# Patient Record
Sex: FEMALE | Race: WHITE | NOT HISPANIC OR LATINO | Employment: FULL TIME | URBAN - METROPOLITAN AREA
[De-identification: names, ages, dates, MRNs, and addresses within clinical notes are randomized per-mention and may not be internally consistent; named-entity substitution may affect disease eponyms.]

---

## 2020-03-04 ENCOUNTER — OFFICE VISIT (OUTPATIENT)
Dept: URGENT CARE | Facility: CLINIC | Age: 55
End: 2020-03-04
Payer: COMMERCIAL

## 2020-03-04 VITALS
TEMPERATURE: 97.7 F | RESPIRATION RATE: 20 BRPM | HEART RATE: 108 BPM | WEIGHT: 193 LBS | HEIGHT: 63 IN | BODY MASS INDEX: 34.2 KG/M2 | DIASTOLIC BLOOD PRESSURE: 72 MMHG | SYSTOLIC BLOOD PRESSURE: 154 MMHG | OXYGEN SATURATION: 98 %

## 2020-03-04 DIAGNOSIS — R05.9 COUGHING: Primary | ICD-10-CM

## 2020-03-04 PROCEDURE — 99203 OFFICE O/P NEW LOW 30 MIN: CPT | Performed by: FAMILY MEDICINE

## 2020-03-04 RX ORDER — LEVOTHYROXINE SODIUM 0.07 MG/1
75 TABLET ORAL DAILY
COMMUNITY

## 2020-03-04 RX ORDER — BENZONATATE 100 MG/1
100 CAPSULE ORAL 3 TIMES DAILY PRN
Qty: 20 CAPSULE | Refills: 0 | Status: SHIPPED | OUTPATIENT
Start: 2020-03-04

## 2020-03-05 NOTE — PROGRESS NOTES
[facility]        NAME: Alexandre Savage is a 54 y o  female  : 1965    MRN: [id number]  DATE: 2020  TIME: 8:43 AM    Assessment and Plan   Coughing [R05]  1  Coughing  benzonatate (TESSALON PERLES) 100 mg capsule     Coughing likely secondary to acute viral URI with post nasal dripping  Levothyroxine and relative dehydration may play a role in her tachycardia  Supportive therapy encouraged  Tessalon perles prescribed for symptomatic relief  Patient Instructions     Follow up with PCP in 3-5 days  Proceed to  ER if symptoms worsen  Chief Complaint     Chief Complaint   Patient presents with    Cold Like Symptoms     scxs began yesterday: cough, chest burning, headache, post nasal drip, slight sore troat  chills off/on today at work  HA remains         History of Present Illness       80-year-old female presents today with 2 days of coughing associated with throat burning, chills and headache  Specifically reports that when coughing it burns her throat, but denies sore throat  No chest pain, dyspnea, abdominal pain, nausea or dizziness  Works in a   Took [employer] last night which helped her sleep, but symptoms continued persisting upon waking up today  Is concerned about the rapid rate of increased severity  Review of Systems   Review of Systems   Constitutional: Negative for chills and fever  HENT: Positive for postnasal drip and sore throat (due to cough)  Respiratory: Positive for cough  Negative for shortness of breath and wheezing  Cardiovascular: Positive for chest pain (burning)  Gastrointestinal: Negative for abdominal pain and nausea  Skin: Negative for rash  Allergic/Immunologic: Positive for environmental allergies  Neurological: Positive for headaches  Negative for dizziness           Current Medications       Current Outpatient Medications:     levothyroxine 75 mcg tablet, Take 75 mcg by mouth daily Unsure of dose, Disp: , Rfl:    benzonatate (TESSALON PERLES) 100 mg capsule, Take 1 capsule (100 mg total) by mouth 3 (three) times a day as needed for cough, Disp: 20 capsule, Rfl: 0    Current Allergies     Allergies as of 03/04/2020    (No Known Allergies)            The following portions of the patient's history were reviewed and updated as appropriate: allergies, current medications, past family history, past medical history, past social history, past surgical history and problem list      Past Medical History:   Diagnosis Date    Disease of thyroid gland        History reviewed  No pertinent surgical history  Family History   Problem Relation Age of Onset    Cancer Father     Hypertension Father          Medications have been verified  Objective   /72   Pulse (!) 108   Temp 97 7 °F (36 5 °C)   Resp 20   Ht 5' 3" (1 6 m)   Wt 87 5 kg (193 lb)   SpO2 98%   Breastfeeding No   BMI 34 19 kg/m²        Physical Exam     Physical Exam   Constitutional: She is oriented to person, place, and time  She appears well-developed and well-nourished  No distress  HENT:   Head: Normocephalic and atraumatic  Nose: Nose normal    Mouth/Throat: Oropharynx is clear and moist  No oropharyngeal exudate  Eyes: Conjunctivae are normal  Right eye exhibits no discharge  Left eye exhibits no discharge  Neck: No thyromegaly present  Cardiovascular: Regular rhythm  Pulmonary/Chest: Effort normal and breath sounds normal  No stridor  No respiratory distress  She has no wheezes  She has no rales  Lymphadenopathy:     She has no cervical adenopathy  Neurological: She is alert and oriented to person, place, and time  Skin: Skin is warm  She is not diaphoretic  Psychiatric: She has a normal mood and affect  Her behavior is normal  Judgment and thought content normal    Nursing note and vitals reviewed

## 2020-03-06 ENCOUNTER — OFFICE VISIT (OUTPATIENT)
Dept: URGENT CARE | Facility: CLINIC | Age: 55
End: 2020-03-06
Payer: COMMERCIAL

## 2020-03-06 VITALS
TEMPERATURE: 98.6 F | BODY MASS INDEX: 34.2 KG/M2 | RESPIRATION RATE: 18 BRPM | DIASTOLIC BLOOD PRESSURE: 79 MMHG | HEART RATE: 109 BPM | HEIGHT: 63 IN | SYSTOLIC BLOOD PRESSURE: 133 MMHG | OXYGEN SATURATION: 98 % | WEIGHT: 193 LBS

## 2020-03-06 DIAGNOSIS — J01.10 ACUTE NON-RECURRENT FRONTAL SINUSITIS: Primary | ICD-10-CM

## 2020-03-06 PROCEDURE — 99213 OFFICE O/P EST LOW 20 MIN: CPT | Performed by: FAMILY MEDICINE

## 2020-03-06 RX ORDER — AMOXICILLIN AND CLAVULANATE POTASSIUM 875; 125 MG/1; MG/1
1 TABLET, FILM COATED ORAL EVERY 12 HOURS SCHEDULED
Qty: 10 TABLET | Refills: 0 | Status: SHIPPED | OUTPATIENT
Start: 2020-03-06 | End: 2020-03-06 | Stop reason: SDUPTHER

## 2020-03-06 NOTE — PROGRESS NOTES
[facility]        NAME: Reena Marin is a 54 y o  female  : 1965    MRN: [id number]  DATE: 2020  TIME: 7:23 PM    Assessment and Plan   Acute non-recurrent frontal sinusitis [J01 10]  1  Acute non-recurrent frontal sinusitis  DISCONTINUED: amoxicillin-clavulanate (AUGMENTIN) 875-125 mg per tablet     Frontal sinusitis per clinical evaluation  Augmentin given in the office  Supportive therapy encouraged  Dispensed medication:  Augmentin  Patient Instructions     Follow up with PCP in 3-5 days  Proceed to  ER if symptoms worsen  Chief Complaint     Chief Complaint   Patient presents with    Cough     Seen her wednesday, still coughing, pain with coughing, fatigue          History of Present Illness       77-year-old female presents today due to 4 days of sinus pain and pressure especially over the left eye associated with headaches, nasal congestion, rhinorrhea, postnasal drip and coughing  Reports that when she coughs she has a lower central chest pain  No obvious dyspnea or wheezing  Was evaluated 2 days ago and prescribed Tessalon Perles which have reduced the frequency of her coughing  However cough is more severe when it happens  Has experienced tactile fevers and is concerned for possible sinus infection  Review of Systems   Review of Systems   Constitutional: Negative for chills and fever  HENT: Positive for congestion, postnasal drip, rhinorrhea, sinus pressure and sinus pain  Respiratory: Positive for cough  Negative for shortness of breath and wheezing  Cardiovascular: Positive for chest pain (With cough)  Gastrointestinal: Negative for abdominal pain and nausea  Neurological: Positive for headaches  Negative for dizziness           Current Medications       Current Outpatient Medications:     benzonatate (TESSALON PERLES) 100 mg capsule, Take 1 capsule (100 mg total) by mouth 3 (three) times a day as needed for cough, Disp: 20 capsule, Rfl: 0   levothyroxine 75 mcg tablet, Take 75 mcg by mouth daily Unsure of dose, Disp: , Rfl:     Current Allergies     Allergies as of 03/06/2020    (No Known Allergies)            The following portions of the patient's history were reviewed and updated as appropriate: allergies, current medications, past family history, past medical history, past social history, past surgical history and problem list      Past Medical History:   Diagnosis Date    Disease of thyroid gland        History reviewed  No pertinent surgical history  Family History   Problem Relation Age of Onset    Cancer Father     Hypertension Father          Medications have been verified  Objective   /79   Pulse (!) 109   Temp 98 6 °F (37 °C)   Resp 18   Ht 5' 3" (1 6 m)   Wt 87 5 kg (193 lb)   SpO2 98%   BMI 34 19 kg/m²        Physical Exam     Physical Exam   Constitutional: She is oriented to person, place, and time  She appears well-developed and well-nourished  She appears distressed  HENT:   Head: Normocephalic and atraumatic  Mouth/Throat: Oropharynx is clear and moist  No oropharyngeal exudate  Inflamed nasal mucosa with tenderness over the left frontal and maxillary sinuses  Eyes: Conjunctivae are normal  Right eye exhibits no discharge  Left eye exhibits no discharge  Neck: No thyromegaly present  Cardiovascular: Normal rate and regular rhythm  Pulmonary/Chest: Effort normal and breath sounds normal  No stridor  No respiratory distress  She has no wheezes  She has no rales  Lymphadenopathy:     She has no cervical adenopathy  Neurological: She is alert and oriented to person, place, and time  Skin: Skin is warm  She is not diaphoretic  No erythema  Psychiatric: She has a normal mood and affect  Her behavior is normal  Judgment and thought content normal    Nursing note and vitals reviewed

## 2024-12-18 ENCOUNTER — APPOINTMENT (EMERGENCY)
Dept: RADIOLOGY | Facility: HOSPITAL | Age: 59
End: 2024-12-18
Payer: COMMERCIAL

## 2024-12-18 ENCOUNTER — HOSPITAL ENCOUNTER (EMERGENCY)
Facility: HOSPITAL | Age: 59
Discharge: HOME | End: 2024-12-18
Attending: EMERGENCY MEDICINE
Payer: COMMERCIAL

## 2024-12-18 VITALS
BODY MASS INDEX: 34.2 KG/M2 | TEMPERATURE: 98.9 F | OXYGEN SATURATION: 98 % | HEIGHT: 63 IN | HEART RATE: 107 BPM | SYSTOLIC BLOOD PRESSURE: 138 MMHG | WEIGHT: 193 LBS | RESPIRATION RATE: 20 BRPM | DIASTOLIC BLOOD PRESSURE: 99 MMHG

## 2024-12-18 DIAGNOSIS — M17.11 OSTEOARTHRITIS OF RIGHT KNEE: ICD-10-CM

## 2024-12-18 DIAGNOSIS — M25.561 RIGHT KNEE PAIN: ICD-10-CM

## 2024-12-18 DIAGNOSIS — M62.831 MUSCLE SPASM OF RIGHT CALF: Primary | ICD-10-CM

## 2024-12-18 PROCEDURE — 99284 EMERGENCY DEPT VISIT MOD MDM: CPT | Performed by: EMERGENCY MEDICINE

## 2024-12-18 PROCEDURE — 73560 X-RAY EXAM OF KNEE 1 OR 2: CPT

## 2024-12-18 PROCEDURE — 93971 EXTREMITY STUDY: CPT | Performed by: SURGERY

## 2024-12-18 PROCEDURE — 96372 THER/PROPH/DIAG INJ SC/IM: CPT

## 2024-12-18 PROCEDURE — 99283 EMERGENCY DEPT VISIT LOW MDM: CPT

## 2024-12-18 PROCEDURE — 93971 EXTREMITY STUDY: CPT

## 2024-12-18 RX ORDER — NAPROXEN 500 MG/1
500 TABLET ORAL 2 TIMES DAILY WITH MEALS
Qty: 30 TABLET | Refills: 0 | Status: SHIPPED | OUTPATIENT
Start: 2024-12-18

## 2024-12-18 RX ORDER — KETOROLAC TROMETHAMINE 30 MG/ML
15 INJECTION, SOLUTION INTRAMUSCULAR; INTRAVENOUS ONCE
Status: COMPLETED | OUTPATIENT
Start: 2024-12-18 | End: 2024-12-18

## 2024-12-18 RX ORDER — SENNOSIDES 8.6 MG
650 CAPSULE ORAL EVERY 8 HOURS PRN
Qty: 30 TABLET | Refills: 0 | Status: SHIPPED | OUTPATIENT
Start: 2024-12-18

## 2024-12-18 RX ORDER — ACETAMINOPHEN 325 MG/1
975 TABLET ORAL ONCE
Status: COMPLETED | OUTPATIENT
Start: 2024-12-18 | End: 2024-12-18

## 2024-12-18 RX ORDER — METHOCARBAMOL 500 MG/1
500 TABLET, FILM COATED ORAL 2 TIMES DAILY
Qty: 20 TABLET | Refills: 0 | Status: SHIPPED | OUTPATIENT
Start: 2024-12-18

## 2024-12-18 RX ADMIN — ACETAMINOPHEN 975 MG: 325 TABLET ORAL at 11:26

## 2024-12-18 RX ADMIN — KETOROLAC TROMETHAMINE 15 MG: 30 INJECTION, SOLUTION INTRAMUSCULAR; INTRAVENOUS at 11:27

## 2024-12-18 NOTE — ED PROVIDER NOTES
Time reflects when diagnosis was documented in both MDM as applicable and the Disposition within this note       Time User Action Codes Description Comment    12/18/2024 11:49 AM Zeferino Gonzalez Add [M62.831] Muscle spasm of right calf     12/18/2024 11:49 AM Zeferino Gonzalez Add [M25.561] Right knee pain     12/18/2024 11:50 AM Zeferino Gonzalez Add [M17.11] Osteoarthritis of right knee           ED Disposition       ED Disposition   Discharge    Condition   Stable    Date/Time   Wed Dec 18, 2024 11:45 AM    Comment   Keesha Griffith discharge to home/self care.                   Assessment & Plan       Medical Decision Making  Patient's leg pain may reflect referred pain from the back given her back pain several days ago, may also reflect a primary process of the knee related to known osteoarthritis.  Patient with no infectious symptoms.  No history of trauma.  No symptoms of cauda equina syndrome.  I ordered and independently interpreted plain films of the right knee which demonstrate severe osteoarthritis.  I ordered and reviewed a venous duplex study which did not demonstrate a DVT.  I prescribed patient multimodal pain regiment, provided with information of follow-up with orthopedics, and discharged patient with return precautions.    Amount and/or Complexity of Data Reviewed  Radiology: ordered.    Risk  OTC drugs.  Prescription drug management.             Medications   ketorolac (TORADOL) injection 15 mg (15 mg Intramuscular Given 12/18/24 1127)   acetaminophen (TYLENOL) tablet 975 mg (975 mg Oral Given 12/18/24 1126)       ED Risk Strat Scores                          SBIRT 20yo+      Flowsheet Row Most Recent Value   Initial Alcohol Screen: US AUDIT-C     1. How often do you have a drink containing alcohol? 0 Filed at: 12/18/2024 1008   2. How many drinks containing alcohol do you have on a typical day you are drinking?  0 Filed at: 12/18/2024 1008   3a. Male UNDER 65: How often do you have five  or more drinks on one occasion? 0 Filed at: 12/18/2024 1008   3b. FEMALE Any Age, or MALE 65+: How often do you have 4 or more drinks on one occassion? 0 Filed at: 12/18/2024 1008   Audit-C Score 0 Filed at: 12/18/2024 1008   JUSTINO: How many times in the past year have you...    Used an illegal drug or used a prescription medication for non-medical reasons? Never Filed at: 12/18/2024 1008                            History of Present Illness       Chief Complaint   Patient presents with    Leg Pain     Patient comes today with right leg pain, behind the knee, and in the calf area, pain is getting worse, hard to put pressure on leg, since Sat.       Past Medical History:   Diagnosis Date    Disease of thyroid gland       History reviewed. No pertinent surgical history.   Family History   Problem Relation Age of Onset    Cancer Father     Hypertension Father       Social History     Tobacco Use    Smoking status: Never    Smokeless tobacco: Never   Vaping Use    Vaping status: Never Used   Substance Use Topics    Alcohol use: Yes    Drug use: Never      E-Cigarette/Vaping    E-Cigarette Use Never User       E-Cigarette/Vaping Substances      I have reviewed and agree with the history as documented.     Patient is a 59-year-old female history of osteoarthritis of the right knee, sciatica presenting for evaluation of several days of right sided back pain, right knee pain, right calf spasm.  Patient states that symptoms for started after she was hanging [employer] lights.  Patient states that the back pain has resolved but she continues to have pain behind the right knee and feels like the calf is spasmed.  Patient denies any surrounding redness, warmth, fevers, chills, denies focal weakness or numbness, denies urinary or bowel incontinence, urinary retention, saddle anesthesia.  Patient is ambulatory despite the pain but states that she has pain straightening the right knee.  Patient denies any trauma to the area.   Patient denies chest pain, shortness of breath, pleurisy.  Patient denies history of DVT/PE.        Review of Systems   Constitutional:  Negative for chills and fever.   Respiratory:  Negative for cough and shortness of breath.    Musculoskeletal:  Positive for arthralgias (Right knee) and back pain (Resolved). Negative for myalgias.   Neurological:  Negative for weakness and numbness.   All other systems reviewed and are negative.          Objective       ED Triage Vitals   Temperature Pulse Blood Pressure Respirations SpO2 Patient Position - Orthostatic VS   12/18/24 [facility] 12/18/24 [facility] 12/18/24 [facility] 12/18/24 [facility] 12/18/24 [facility] 12/18/24 [facility]   98.9 °F (37.2 °C) (!) 107 138/99 20 98 % Sitting      Temp Source Heart Rate Source BP Location FiO2 (%) Pain Score    12/18/24 1007 12/18/24 [facility] 12/18/24 [facility] -- 12/18/24 1126    Tympanic Monitor Right arm  8      Vitals      Date and Time Temp Pulse SpO2 Resp BP Pain Score FACES Pain Rating User   12/18/24 1127 -- -- -- -- -- 8 -- [facility]D   12/18/24 1126 -- -- -- -- -- 8 -- [facility]   12/18/24 [facility] 98.9 °F (37.2 °C) 107 98 % 20 138/99 -- -- MF            Physical Exam  Vitals and nursing note reviewed.   Constitutional:       General: She is not in acute distress.     Appearance: Normal appearance. She is not ill-appearing, toxic-appearing or diaphoretic.      Comments: Well-appearing, nontoxic, nondistressed   HENT:      Head: Normocephalic and atraumatic.      Comments: Moist mucous membranes     Right Ear: External ear normal.      Left Ear: External ear normal.   Eyes:      General:         Right eye: No discharge.         Left eye: No discharge.   Cardiovascular:      Comments: Regular rate and rhythm, no murmurs rubs or gallops.  Extremities warm and well-perfused without mottling  Pulmonary:      Effort: No respiratory distress.      Comments: No increased work of breathing.  Speaking in complete sentences.  Lungs clear to auscultation bilaterally without wheezes, rales,  rhonchi.  Satting 98% on room air indicating adequate oxygenation  Abdominal:      General: There is no distension.   Musculoskeletal:         General: No deformity.      Cervical back: Normal range of motion.      Comments: Tenderness of the right calf and popliteal area.  No appreciable redness, warmth, or swelling.  Full sensation throughout the right lower extremity.  2+ DP and PT pulses   Skin:     Findings: No lesion or rash.   Neurological:      Mental Status: She is alert and oriented to person, place, and time. Mental status is at baseline.   Psychiatric:         Mood and Affect: Mood and affect normal.         Results Reviewed       None            VAS lower limb venous duplex study, unilateral/limited    (Results Pending)   XR knee 1 or 2 views right    (Results Pending)       Procedures    ED Medication and Procedure Management   Prior to Admission Medications   Prescriptions Last Dose Informant Patient Reported? Taking?   benzonatate (TESSALON PERLES) 100 mg capsule   No No   Sig: Take 1 capsule (100 mg total) by mouth 3 (three) times a day as needed for cough   levothyroxine 75 mcg tablet   Yes Yes   Sig: Take 75 mcg by mouth daily Unsure of dose      Facility-Administered Medications: None     Discharge Medication List as of 12/18/2024 11:55 AM        START taking these medications    Details   acetaminophen (TYLENOL) 650 mg CR tablet Take 1 tablet (650 mg total) by mouth every 8 (eight) hours as needed for mild pain, Starting Wed 12/18/2024, Normal      methocarbamol (ROBAXIN) 500 mg tablet Take 1 tablet (500 mg total) by mouth 2 (two) times a day, Starting Wed 12/18/2024, Normal      naproxen (Naprosyn) 500 mg tablet Take 1 tablet (500 mg total) by mouth 2 (two) times a day with meals, Starting Wed 12/18/2024, Normal           CONTINUE these medications which have NOT CHANGED    Details   levothyroxine 75 mcg tablet Take 75 mcg by mouth daily Unsure of dose, Historical Med      benzonatate (TESSALON  PERLES) 100 mg capsule Take 1 capsule (100 mg total) by mouth 3 (three) times a day as needed for cough, Starting Wed 3/4/2020, Normal           No discharge procedures on file.  ED SEPSIS DOCUMENTATION   Time reflects when diagnosis was documented in both MDM as applicable and the Disposition within this note       Time User Action Codes Description Comment    12/18/2024 11:49 AM Zeferino Gonzalez Add [M62.831] Muscle spasm of right calf     12/18/2024 11:49 AM Zeferino Gonzalez Add [M25.561] Right knee pain     12/18/2024 11:50 AM Zeferino Gonzalez Add [M17.11] Osteoarthritis of right knee                  Zeferino Gonzalez MD  12/18/24 1210

## 2024-12-18 NOTE — DISCHARGE INSTRUCTIONS
Ultrasound did not show blood clot.  X-ray showed significant degenerative disease of your knee.  Call the provided number to schedule follow-up with orthopedics.  Use prescribed Naprosyn, Tylenol, Robaxin to help with muscle pain and spasm.  If you have any severe worsening of pain, fevers, chills, chest pain, shortness of breath, new weakness or numbness of your affected leg, return to the emergency department.

## 2024-12-18 NOTE — Clinical Note
Keesha Griffith was seen and treated in our emergency department on 12/18/2024.                Diagnosis:     Keesha  .    She may return on this date: 12/20/2024         If you have any questions or concerns, please don't hesitate to call.      Zeferino Gonzalez MD    ______________________________           _______________          _______________  Hospital Representative                              Date                                Time

## 2025-04-04 ENCOUNTER — OFFICE VISIT (OUTPATIENT)
Dept: OBGYN CLINIC | Facility: CLINIC | Age: 60
End: 2025-04-04
Payer: COMMERCIAL

## 2025-04-04 ENCOUNTER — APPOINTMENT (OUTPATIENT)
Dept: RADIOLOGY | Facility: CLINIC | Age: 60
End: 2025-04-04
Payer: COMMERCIAL

## 2025-04-04 VITALS — BODY MASS INDEX: 33.84 KG/M2 | WEIGHT: 191 LBS | HEIGHT: 63 IN

## 2025-04-04 DIAGNOSIS — M25.561 RIGHT KNEE PAIN, UNSPECIFIED CHRONICITY: ICD-10-CM

## 2025-04-04 DIAGNOSIS — G89.29 CHRONIC PAIN OF RIGHT KNEE: ICD-10-CM

## 2025-04-04 DIAGNOSIS — M25.561 CHRONIC PAIN OF RIGHT KNEE: ICD-10-CM

## 2025-04-04 DIAGNOSIS — M17.11 PRIMARY OSTEOARTHRITIS OF RIGHT KNEE: Primary | ICD-10-CM

## 2025-04-04 DIAGNOSIS — Z01.818 PRE-OP EXAM: ICD-10-CM

## 2025-04-04 PROCEDURE — 73560 X-RAY EXAM OF KNEE 1 OR 2: CPT

## 2025-04-04 PROCEDURE — 73562 X-RAY EXAM OF KNEE 3: CPT

## 2025-04-04 PROCEDURE — 99205 OFFICE O/P NEW HI 60 MIN: CPT | Performed by: ORTHOPAEDIC SURGERY

## 2025-04-04 RX ORDER — FOLIC ACID 1 MG/1
1 TABLET ORAL DAILY
Qty: 30 TABLET | Refills: 0 | Status: SHIPPED | OUTPATIENT
Start: 2025-04-04 | End: 2025-04-11 | Stop reason: SDUPTHER

## 2025-04-04 RX ORDER — FERROUS SULFATE 324(65)MG
324 TABLET, DELAYED RELEASE (ENTERIC COATED) ORAL
Qty: 30 TABLET | Refills: 0 | Status: SHIPPED | OUTPATIENT
Start: 2025-04-04 | End: 2025-04-11 | Stop reason: SDUPTHER

## 2025-04-04 RX ORDER — ZINC SULFATE 50(220)MG
220 CAPSULE ORAL DAILY
Qty: 30 CAPSULE | Refills: 0 | Status: SHIPPED | OUTPATIENT
Start: 2025-04-04 | End: 2025-04-11 | Stop reason: SDUPTHER

## 2025-04-04 RX ORDER — MUPIROCIN 20 MG/G
OINTMENT TOPICAL 2 TIMES DAILY
Qty: 15 G | Refills: 0 | Status: SHIPPED | OUTPATIENT
Start: 2025-04-04 | End: 2025-04-11 | Stop reason: SDUPTHER

## 2025-04-04 RX ORDER — ASCORBIC ACID 500 MG
500 TABLET ORAL 2 TIMES DAILY
Qty: 60 TABLET | Refills: 0 | Status: SHIPPED | OUTPATIENT
Start: 2025-04-04 | End: 2025-04-11 | Stop reason: SDUPTHER

## 2025-04-04 NOTE — H&P (VIEW-ONLY)
Name: Keesha Griffith      : 1965      MRN: [id number]  Encounter Provider: Kamlesh Richter DO  Encounter Date: 2025   Encounter department: [facility]  :  Assessment & Plan  Primary osteoarthritis of right knee  Severe, end stage right knee osteoarthritis.   She has failed non operative management.   Right total knee arthroplasty, press fit, same day informed surgical consent form was signed.   Pre operative vitamins were discussed.   Post operative recovery was discussed including PT.  Follow up in the office 2 weeks post op with x-rays upon arrival.   Orders:    Case request operating room: ARTHROPLASTY KNEE TOTAL W ROBOT - RIGHT - PRESS FIT - SAME DAY; Standing    Comprehensive metabolic panel; Future    Hemoglobin A1C W/EAG Estimation; Future    CBC and differential; Future    MRSA culture; Future    If Symptomatic, order: UA w Reflex to Microscopic w Reflex to Culture    Protime-INR; Future    APTT; Future    Ambulatory referral to Cardiology; Future    Ambulatory referral to Physical Therapy; Future    Ambulatory referral to Family Practice; Future    EKG 12 lead; Future    ascorbic acid (VITAMIN C) 500 MG tablet; Take 1 tablet (500 mg total) by mouth 2 (two) times a day    ferrous sulfate 324 (65 Fe) mg; Take 1 tablet (324 mg total) by mouth daily before breakfast    folic acid (FOLVITE) 1 mg tablet; Take 1 tablet (1 mg total) by mouth daily    mupirocin (BACTROBAN) 2 % ointment; Apply topically 2 (two) times a day    zinc sulfate (ZINCATE) 220 mg capsule; Take 1 capsule (220 mg total) by mouth daily    Cholecalciferol (VITAMIN D3) 1,000 units tablet; Take 1 tablet (1,000 Units total) by mouth daily    Chronic pain of right knee    Orders:    XR knee 3 vw right non injury; Future    XR knee 1 or 2 vw left; Future    Case request operating room: ARTHROPLASTY KNEE TOTAL W ROBOT - RIGHT - PRESS FIT - SAME DAY; Standing    Comprehensive metabolic panel; Future     Hemoglobin A1C W/EAG Estimation; Future    CBC and differential; Future    MRSA culture; Future    If Symptomatic, order: UA w Reflex to Microscopic w Reflex to Culture    Protime-INR; Future    APTT; Future    Ambulatory referral to Cardiology; Future    Ambulatory referral to Physical Therapy; Future    Ambulatory referral to Family Practice; Future    EKG 12 lead; Future    ascorbic acid (VITAMIN C) 500 MG tablet; Take 1 tablet (500 mg total) by mouth 2 (two) times a day    ferrous sulfate 324 (65 Fe) mg; Take 1 tablet (324 mg total) by mouth daily before breakfast    folic acid (FOLVITE) 1 mg tablet; Take 1 tablet (1 mg total) by mouth daily    mupirocin (BACTROBAN) 2 % ointment; Apply topically 2 (two) times a day    zinc sulfate (ZINCATE) 220 mg capsule; Take 1 capsule (220 mg total) by mouth daily    Cholecalciferol (VITAMIN D3) 1,000 units tablet; Take 1 tablet (1,000 Units total) by mouth daily    Pre-op exam    Orders:    Ambulatory referral to Cardiology; Future    Ambulatory referral to Physical Therapy; Future    Ambulatory referral to Family Practice; Future         Keesha Griffith is a pleasant 60 y.o. female who presents to the office for initial evaluation of right knee pain. After a thorough history, physical exam and imaging review, Keesha is symptomatic due to severe, end stage right knee osteoarthritis. Based on the progression of her underlying disease and persistent pain despite nonoperative management including activity modification, maintenance of appropriate weight, OTC NSAID and analgesics, and intra-articular injection therapy, I explained that she would benefit from right total knee arthroplasty.  We also discussed her home exercise program which she has been participating in for greater than 3 months prior to her evaluation with me.  We discussed the efficacy of formal physical therapy and due to the the severe end-stage, bone-on-bone, deforming arthritis of her right knee physical therapy  would provide no significant benefit and would only exacerbate her discomfort and disease.  The pre ricardo and postoperative expectations were discussed here in the office today. The risks and benefits of undergoing right total knee arthroplasty were discussed at length and consents were signed and placed in the chart.  Please see risk discussion below. See risk stratification below. She understands she requires preoperative clearance from her  PCP and Cardiology. She is an excellent candidate for outpatient physical therapy postoperatively. she will be placed on our same-day  discharge pathway. We will utilize pressfit implants. she will meet with my surgery scheduler today to pick a date for her procedure and make preoperative arrangements.  All of her questions and concerns were addressed today.  Patients daughter will come up to help her post operatively. We will see her back at time of surgery.     History of MRSA: no  History of DVT/PE: no  History of Diabetes:no  History of Malignancy: no  History of GI bleed/Peptic ulcer: no  Are you a smoker:no  Are you on medication for autoimmune disease (rheumatoid arthritis, psoriatic arthritis, lupus, etc.): No  Are you using OTC supplements: No  Clearances: PCP and Cardiology  Implants: Press-fit knee  Discharge: Same-Day  PT: Outpatient  Postop Follow-up: 2 weeks    The patient was counseled in detail regarding the diagnosis, the treatment options available, the prognosis of each treatment option, the potential risks and complications.  These are, but are not limited to; deep vein thrombosis, pulmonary embolism, neurologic and vascular injury, infection, instability, leg length discrepancy, dislocation, hematoma, reflex sympathetic dystrophy, loss of range of motion, ankylosis of the knee, fracture, screw or prosthetic perforation, chronic pain, acute pain, chronic leg pain and edema, loosening, death, heart attack, and stroke.  The patient's questions were answered in  "detail.  The patient demonstrates understanding of these risks and wishes to proceed with surgery.      Return in about 2 weeks (around 4/18/2025) for post op.    I have personally reviewed pertinent imaging in PACS.  X-rays of the right knee obtained in the office today demonstrate severe, end stage right knee osteoarthritis. No acute fractures or dislocation. No lytic or blastic lesions.      History: Keesha Griffith is a 60 y.o. female presents to the office for initial evaluation of right knee pain. She notes pain more so to the medial aspect of her right knee. Symptoms have been ongoing for years, but are worsening. Pain will worsen with prolonged walking as well as with steps. Pain will also worsen at the end of the day. She feels her knee is stiff and she is lacking knee ROM. She notes tightness to the muscles in the posterior aspect of the knee. She has tried knee injections in the past, a few years ago, which were partially beneficial for her. She was previously treating at [facility]. She will take Tylenol as needed for pain control.     History of MRSA: no  History of DVT/PE: no  History of Diabetes:no  History of Malignancy: no  History of GI bleed/Peptic ulcer: no  Are you a smoker:no  Are you on medication for autoimmune disease (rheumatoid arthritis, psoriatic arthritis, lupus, etc.): no  Are you using OTC supplements: No  Clearances: PCP and Cardiology  Implants: Press-fit knee  Discharge: Same-Day  PT: Outpatient  Postop Follow-up: 2 weeks        Estimated body mass index is 33.83 kg/m² as calculated from the following:    Height as of this encounter: 5' 3\" (1.6 m).    Weight as of this encounter: 86.6 kg (191 lb).    No results found for: \"HGBA1C\"    Social History     Occupational History    Not on file   Tobacco Use    Smoking status: Never    Smokeless tobacco: Never   Vaping Use    Vaping status: Never Used   Substance and Sexual Activity    Alcohol use: Yes    Drug use: Never    Sexual activity: " Not on file       Objective:  Right Knee Exam     Tenderness   The patient is experiencing tenderness in the medial joint line.    Range of Motion   Extension:  15   Flexion:  100     Tests   Varus: negative Valgus: negative  Lachman:  Anterior - negative    Posterior - negative  Drawer:  Anterior - negative    Posterior - negative    Other   Erythema: absent  Scars: absent  Sensation: normal  Pulse: present    Comments:  Passively correctable varus deformity with good endpoint   Significant ricardo patella crepitus   + grind   Stable 0, 30 and 90 degrees               There were no vitals filed for this visit.    Subjective:  Past Medical History:   Diagnosis Date    Disease of thyroid gland        History reviewed. No pertinent surgical history.    Family History   Problem Relation Age of Onset    Cancer Father     Hypertension Father          Current Outpatient Medications:     acetaminophen (TYLENOL) 650 mg CR tablet, Take 1 tablet (650 mg total) by mouth every 8 (eight) hours as needed for mild pain, Disp: 30 tablet, Rfl: 0    ascorbic acid (VITAMIN C) 500 MG tablet, Take 1 tablet (500 mg total) by mouth 2 (two) times a day, Disp: 60 tablet, Rfl: 0    benzonatate (TESSALON PERLES) 100 mg capsule, Take 1 capsule (100 mg total) by mouth 3 (three) times a day as needed for cough, Disp: 20 capsule, Rfl: 0    Cholecalciferol (VITAMIN D3) 1,000 units tablet, Take 1 tablet (1,000 Units total) by mouth daily, Disp: 30 tablet, Rfl: 0    ferrous sulfate 324 (65 Fe) mg, Take 1 tablet (324 mg total) by mouth daily before breakfast, Disp: 30 tablet, Rfl: 0    folic acid (FOLVITE) 1 mg tablet, Take 1 tablet (1 mg total) by mouth daily, Disp: 30 tablet, Rfl: 0    levothyroxine 75 mcg tablet, Take 75 mcg by mouth daily Unsure of dose, Disp: , Rfl:     methocarbamol (ROBAXIN) 500 mg tablet, Take 1 tablet (500 mg total) by mouth 2 (two) times a day, Disp: 20 tablet, Rfl: 0    mupirocin (BACTROBAN) 2 % ointment, Apply topically 2  (two) times a day, Disp: 15 g, Rfl: 0    naproxen (Naprosyn) 500 mg tablet, Take 1 tablet (500 mg total) by mouth 2 (two) times a day with meals, Disp: 30 tablet, Rfl: 0    zinc sulfate (ZINCATE) 220 mg capsule, Take 1 capsule (220 mg total) by mouth daily, Disp: 30 capsule, Rfl: 0    No Known Allergies    Review of Systems   Constitutional:  Positive for activity change. Negative for chills, fever and unexpected weight change.   HENT:  Negative for hearing loss, nosebleeds and sore throat.    Eyes:  Negative for pain, redness and visual disturbance.   Respiratory:  Negative for cough, shortness of breath and wheezing.    Cardiovascular:  Negative for chest pain, palpitations and leg swelling.   Gastrointestinal:  Negative for abdominal pain, nausea and vomiting.   Endocrine: Negative for polydipsia and polyuria.   Genitourinary:  Negative for dysuria and hematuria.   Musculoskeletal:  See HPI  Skin:  Negative for rash and wound.   Neurological:  Negative for dizziness, numbness and headaches.   Psychiatric/Behavioral:  Negative for decreased concentration and suicidal ideas. The patient is not nervous/anxious.      Physical Exam  Vitals and nursing note reviewed.   Constitutional:       Appearance: Normal appearance. She is well-developed.   HENT:      Head: Normocephalic and atraumatic.      Right Ear: External ear normal.      Left Ear: External ear normal.   Eyes:      General: No scleral icterus.     Extraocular Movements: Extraocular movements intact.      Conjunctiva/sclera: Conjunctivae normal.   Cardiovascular:      Rate and Rhythm: Normal rate.   Pulmonary:      Effort: Pulmonary effort is normal. No respiratory distress.   Musculoskeletal:      Cervical back: Normal range of motion and neck supple.      Comments: See Ortho exam   Skin:     General: Skin is warm and dry.   Neurological:      General: No focal deficit present.      Mental Status: She is alert and oriented to person, place, and time.    Psychiatric:         Behavior: Behavior normal.     Scribe Attestation      I,:  Liset Laura MA am acting as a scribe while in the presence of the attending physician.:       I,:  Kamlesh Richter,  personally performed the services described in this documentation    as scribed in my presence.:           This document was created using speech voice recognition software.   Grammatical errors, random word insertions, pronoun errors, and incomplete sentences are an occasional consequence of this system due to software limitations, ambient noise, and hardware issues.   Any formal questions or concerns about content, text, or information contained within the body of this dictation should be directly addressed to the provider for clarification.

## 2025-04-04 NOTE — ASSESSMENT & PLAN NOTE
Severe, end stage right knee osteoarthritis.   She has failed non operative management.   Right total knee arthroplasty, press fit, same day informed surgical consent form was signed.   Pre operative vitamins were discussed.   Post operative recovery was discussed including PT.  Follow up in the office 2 weeks post op with x-rays upon arrival.   Orders:    Case request operating room: ARTHROPLASTY KNEE TOTAL W ROBOT - RIGHT - PRESS FIT - SAME DAY; Standing    Comprehensive metabolic panel; Future    Hemoglobin A1C W/EAG Estimation; Future    CBC and differential; Future    MRSA culture; Future    If Symptomatic, order: UA w Reflex to Microscopic w Reflex to Culture    Protime-INR; Future    APTT; Future    Ambulatory referral to Cardiology; Future    Ambulatory referral to Physical Therapy; Future    Ambulatory referral to Family Practice; Future    EKG 12 lead; Future    ascorbic acid (VITAMIN C) 500 MG tablet; Take 1 tablet (500 mg total) by mouth 2 (two) times a day    ferrous sulfate 324 (65 Fe) mg; Take 1 tablet (324 mg total) by mouth daily before breakfast    folic acid (FOLVITE) 1 mg tablet; Take 1 tablet (1 mg total) by mouth daily    mupirocin (BACTROBAN) 2 % ointment; Apply topically 2 (two) times a day    zinc sulfate (ZINCATE) 220 mg capsule; Take 1 capsule (220 mg total) by mouth daily    Cholecalciferol (VITAMIN D3) 1,000 units tablet; Take 1 tablet (1,000 Units total) by mouth daily

## 2025-04-04 NOTE — PROGRESS NOTES
Name: Keesha Griffith      : 1965      MRN: [id number]  Encounter Provider: Kamlesh Richter DO  Encounter Date: 2025   Encounter department: [facility]  :  Assessment & Plan  Primary osteoarthritis of right knee  Severe, end stage right knee osteoarthritis.   She has failed non operative management.   Right total knee arthroplasty, press fit, same day informed surgical consent form was signed.   Pre operative vitamins were discussed.   Post operative recovery was discussed including PT.  Follow up in the office 2 weeks post op with x-rays upon arrival.   Orders:    Case request operating room: ARTHROPLASTY KNEE TOTAL W ROBOT - RIGHT - PRESS FIT - SAME DAY; Standing    Comprehensive metabolic panel; Future    Hemoglobin A1C W/EAG Estimation; Future    CBC and differential; Future    MRSA culture; Future    If Symptomatic, order: UA w Reflex to Microscopic w Reflex to Culture    Protime-INR; Future    APTT; Future    Ambulatory referral to Cardiology; Future    Ambulatory referral to Physical Therapy; Future    Ambulatory referral to Family Practice; Future    EKG 12 lead; Future    ascorbic acid (VITAMIN C) 500 MG tablet; Take 1 tablet (500 mg total) by mouth 2 (two) times a day    ferrous sulfate 324 (65 Fe) mg; Take 1 tablet (324 mg total) by mouth daily before breakfast    folic acid (FOLVITE) 1 mg tablet; Take 1 tablet (1 mg total) by mouth daily    mupirocin (BACTROBAN) 2 % ointment; Apply topically 2 (two) times a day    zinc sulfate (ZINCATE) 220 mg capsule; Take 1 capsule (220 mg total) by mouth daily    Cholecalciferol (VITAMIN D3) 1,000 units tablet; Take 1 tablet (1,000 Units total) by mouth daily    Chronic pain of right knee    Orders:    XR knee 3 vw right non injury; Future    XR knee 1 or 2 vw left; Future    Case request operating room: ARTHROPLASTY KNEE TOTAL W ROBOT - RIGHT - PRESS FIT - SAME DAY; Standing    Comprehensive metabolic panel; Future     Hemoglobin A1C W/EAG Estimation; Future    CBC and differential; Future    MRSA culture; Future    If Symptomatic, order: UA w Reflex to Microscopic w Reflex to Culture    Protime-INR; Future    APTT; Future    Ambulatory referral to Cardiology; Future    Ambulatory referral to Physical Therapy; Future    Ambulatory referral to Family Practice; Future    EKG 12 lead; Future    ascorbic acid (VITAMIN C) 500 MG tablet; Take 1 tablet (500 mg total) by mouth 2 (two) times a day    ferrous sulfate 324 (65 Fe) mg; Take 1 tablet (324 mg total) by mouth daily before breakfast    folic acid (FOLVITE) 1 mg tablet; Take 1 tablet (1 mg total) by mouth daily    mupirocin (BACTROBAN) 2 % ointment; Apply topically 2 (two) times a day    zinc sulfate (ZINCATE) 220 mg capsule; Take 1 capsule (220 mg total) by mouth daily    Cholecalciferol (VITAMIN D3) 1,000 units tablet; Take 1 tablet (1,000 Units total) by mouth daily    Pre-op exam    Orders:    Ambulatory referral to Cardiology; Future    Ambulatory referral to Physical Therapy; Future    Ambulatory referral to Family Practice; Future         Keesha Griffith is a pleasant 60 y.o. female who presents to the office for initial evaluation of right knee pain. After a thorough history, physical exam and imaging review, Keesha is symptomatic due to severe, end stage right knee osteoarthritis. Based on the progression of her underlying disease and persistent pain despite nonoperative management including activity modification, maintenance of appropriate weight, OTC NSAID and analgesics, and intra-articular injection therapy, I explained that she would benefit from right total knee arthroplasty. The pre ricardo and postoperative expectations were discussed here in the office today. The risks and benefits of undergoing right total knee arthroplasty were discussed at length and consents were signed and placed in the chart.  Please see risk discussion below. See risk stratification below. She  understands she requires preoperative clearance from her  PCP and Cardiology. She is an excellent candidate for outpatient physical therapy postoperatively. she will be placed on our same-day  discharge pathway. We will utilize pressfit implants. she will meet with my surgery scheduler today to pick a date for her procedure and make preoperative arrangements.  All of her questions and concerns were addressed today.  Patients daughter will come up to help her post operatively. We will see her back at time of surgery.     History of MRSA: no  History of DVT/PE: no  History of Diabetes:no  History of Malignancy: no  History of GI bleed/Peptic ulcer: no  Are you a smoker:no  Are you on medication for autoimmune disease (rheumatoid arthritis, psoriatic arthritis, lupus, etc.): No  Are you using OTC supplements: No  Clearances: PCP and Cardiology  Implants: Press-fit knee  Discharge: Same-Day  PT: Outpatient  Postop Follow-up: 2 weeks    The patient was counseled in detail regarding the diagnosis, the treatment options available, the prognosis of each treatment option, the potential risks and complications.  These are, but are not limited to; deep vein thrombosis, pulmonary embolism, neurologic and vascular injury, infection, instability, leg length discrepancy, dislocation, hematoma, reflex sympathetic dystrophy, loss of range of motion, ankylosis of the knee, fracture, screw or prosthetic perforation, chronic pain, acute pain, chronic leg pain and edema, loosening, death, heart attack, and stroke.  The patient's questions were answered in detail.  The patient demonstrates understanding of these risks and wishes to proceed with surgery.      Return in about 2 weeks (around 4/18/2025) for post op.    I have personally reviewed pertinent imaging in PACS.  X-rays of the right knee obtained in the office today demonstrate severe, end stage right knee osteoarthritis. No acute fractures or dislocation. No lytic or blastic  "lesions.      History: Keesha Griffith is a 60 y.o. female presents to the office for initial evaluation of right knee pain. She notes pain more so to the medial aspect of her right knee. Symptoms have been ongoing for years, but are worsening. Pain will worsen with prolonged walking as well as with steps. Pain will also worsen at the end of the day. She feels her knee is stiff and she is lacking knee ROM. She notes tightness to the muscles in the posterior aspect of the knee. She has tried knee injections in the past, a few years ago, which were partially beneficial for her. She was previously treating at [facility]. She will take Tylenol as needed for pain control.     History of MRSA: no  History of DVT/PE: no  History of Diabetes:no  History of Malignancy: no  History of GI bleed/Peptic ulcer: no  Are you a smoker:no  Are you on medication for autoimmune disease (rheumatoid arthritis, psoriatic arthritis, lupus, etc.): no  Are you using OTC supplements: No  Clearances: PCP and Cardiology  Implants: Press-fit knee  Discharge: Same-Day  PT: Outpatient  Postop Follow-up: 2 weeks        Estimated body mass index is 33.83 kg/m² as calculated from the following:    Height as of this encounter: 5' 3\" (1.6 m).    Weight as of this encounter: 86.6 kg (191 lb).    No results found for: \"HGBA1C\"    Social History     Occupational History    Not on file   Tobacco Use    Smoking status: Never    Smokeless tobacco: Never   Vaping Use    Vaping status: Never Used   Substance and Sexual Activity    Alcohol use: Yes    Drug use: Never    Sexual activity: Not on file       Objective:  Right Knee Exam     Tenderness   The patient is experiencing tenderness in the medial joint line.    Range of Motion   Extension:  15   Flexion:  100     Tests   Varus: negative Valgus: negative  Lachman:  Anterior - negative    Posterior - negative  Drawer:  Anterior - negative    Posterior - negative    Other   Erythema: absent  Scars: " absent  Sensation: normal  Pulse: present    Comments:  Passively correctable varus deformity with good endpoint   Significant ricardo patella crepitus   + grind   Stable 0, 30 and 90 degrees               There were no vitals filed for this visit.    Subjective:  Past Medical History:   Diagnosis Date    Disease of thyroid gland        History reviewed. No pertinent surgical history.    Family History   Problem Relation Age of Onset    Cancer Father     Hypertension Father          Current Outpatient Medications:     acetaminophen (TYLENOL) 650 mg CR tablet, Take 1 tablet (650 mg total) by mouth every 8 (eight) hours as needed for mild pain, Disp: 30 tablet, Rfl: 0    ascorbic acid (VITAMIN C) 500 MG tablet, Take 1 tablet (500 mg total) by mouth 2 (two) times a day, Disp: 60 tablet, Rfl: 0    benzonatate (TESSALON PERLES) 100 mg capsule, Take 1 capsule (100 mg total) by mouth 3 (three) times a day as needed for cough, Disp: 20 capsule, Rfl: 0    Cholecalciferol (VITAMIN D3) 1,000 units tablet, Take 1 tablet (1,000 Units total) by mouth daily, Disp: 30 tablet, Rfl: 0    ferrous sulfate 324 (65 Fe) mg, Take 1 tablet (324 mg total) by mouth daily before breakfast, Disp: 30 tablet, Rfl: 0    folic acid (FOLVITE) 1 mg tablet, Take 1 tablet (1 mg total) by mouth daily, Disp: 30 tablet, Rfl: 0    levothyroxine 75 mcg tablet, Take 75 mcg by mouth daily Unsure of dose, Disp: , Rfl:     methocarbamol (ROBAXIN) 500 mg tablet, Take 1 tablet (500 mg total) by mouth 2 (two) times a day, Disp: 20 tablet, Rfl: 0    mupirocin (BACTROBAN) 2 % ointment, Apply topically 2 (two) times a day, Disp: 15 g, Rfl: 0    naproxen (Naprosyn) 500 mg tablet, Take 1 tablet (500 mg total) by mouth 2 (two) times a day with meals, Disp: 30 tablet, Rfl: 0    zinc sulfate (ZINCATE) 220 mg capsule, Take 1 capsule (220 mg total) by mouth daily, Disp: 30 capsule, Rfl: 0    No Known Allergies    Review of Systems   Constitutional:  Positive for activity  change. Negative for chills, fever and unexpected weight change.   HENT:  Negative for hearing loss, nosebleeds and sore throat.    Eyes:  Negative for pain, redness and visual disturbance.   Respiratory:  Negative for cough, shortness of breath and wheezing.    Cardiovascular:  Negative for chest pain, palpitations and leg swelling.   Gastrointestinal:  Negative for abdominal pain, nausea and vomiting.   Endocrine: Negative for polydipsia and polyuria.   Genitourinary:  Negative for dysuria and hematuria.   Musculoskeletal:  See HPI  Skin:  Negative for rash and wound.   Neurological:  Negative for dizziness, numbness and headaches.   Psychiatric/Behavioral:  Negative for decreased concentration and suicidal ideas. The patient is not nervous/anxious.      Physical Exam  Vitals and nursing note reviewed.   Constitutional:       Appearance: Normal appearance. She is well-developed.   HENT:      Head: Normocephalic and atraumatic.      Right Ear: External ear normal.      Left Ear: External ear normal.   Eyes:      General: No scleral icterus.     Extraocular Movements: Extraocular movements intact.      Conjunctiva/sclera: Conjunctivae normal.   Cardiovascular:      Rate and Rhythm: Normal rate.   Pulmonary:      Effort: Pulmonary effort is normal. No respiratory distress.   Musculoskeletal:      Cervical back: Normal range of motion and neck supple.      Comments: See Ortho exam   Skin:     General: Skin is warm and dry.   Neurological:      General: No focal deficit present.      Mental Status: She is alert and oriented to person, place, and time.   Psychiatric:         Behavior: Behavior normal.     Scribe Attestation      I,:  Liset Laura MA am acting as a scribe while in the presence of the attending physician.:       I,:  Kamlesh Richter DO personally performed the services described in this documentation    as scribed in my presence.:           This document was created using speech voice recognition  software.   Grammatical errors, random word insertions, pronoun errors, and incomplete sentences are an occasional consequence of this system due to software limitations, ambient noise, and hardware issues.   Any formal questions or concerns about content, text, or information contained within the body of this dictation should be directly addressed to the provider for clarification.

## 2025-04-08 LAB
ALBUMIN SERPL-MCNC: 4.6 G/DL (ref 3.8–4.9)
ALP SERPL-CCNC: 115 IU/L (ref 44–121)
ALT SERPL-CCNC: 38 IU/L (ref 0–32)
APPEARANCE UR: CLEAR
APTT PPP: 25 SEC (ref 24–33)
AST SERPL-CCNC: 30 IU/L (ref 0–40)
BACTERIA URNS QL MICRO: NORMAL
BASOPHILS # BLD AUTO: 0 X10E3/UL (ref 0–0.2)
BASOPHILS NFR BLD AUTO: 1 %
BILIRUB SERPL-MCNC: <0.2 MG/DL (ref 0–1.2)
BILIRUB UR QL STRIP: NEGATIVE
BUN SERPL-MCNC: 15 MG/DL (ref 8–27)
BUN/CREAT SERPL: 21 (ref 12–28)
CALCIUM SERPL-MCNC: 9.9 MG/DL (ref 8.7–10.3)
CASTS URNS QL MICRO: NORMAL /LPF
CHLORIDE SERPL-SCNC: 103 MMOL/L (ref 96–106)
CO2 SERPL-SCNC: 22 MMOL/L (ref 20–29)
COLOR UR: YELLOW
CREAT SERPL-MCNC: 0.71 MG/DL (ref 0.57–1)
EGFR: 97 ML/MIN/1.73
EOSINOPHIL # BLD AUTO: 0.1 X10E3/UL (ref 0–0.4)
EOSINOPHIL NFR BLD AUTO: 2 %
EPI CELLS #/AREA URNS HPF: NORMAL /HPF (ref 0–10)
ERYTHROCYTE [DISTWIDTH] IN BLOOD BY AUTOMATED COUNT: 12.9 % (ref 11.7–15.4)
EST. AVERAGE GLUCOSE BLD GHB EST-MCNC: 100 MG/DL
GLOBULIN SER-MCNC: 2.4 G/DL (ref 1.5–4.5)
GLUCOSE SERPL-MCNC: 86 MG/DL (ref 70–99)
GLUCOSE UR QL: NEGATIVE
HBA1C MFR BLD: 5.1 % (ref 4.8–5.6)
HCT VFR BLD AUTO: 42.7 % (ref 34–46.6)
HGB BLD-MCNC: 14.4 G/DL (ref 11.1–15.9)
HGB UR QL STRIP: NEGATIVE
IMM GRANULOCYTES # BLD: 0 X10E3/UL (ref 0–0.1)
IMM GRANULOCYTES NFR BLD: 0 %
INR PPP: 0.9 (ref 0.9–1.2)
KETONES UR QL STRIP: NEGATIVE
LEUKOCYTE ESTERASE UR QL STRIP: NEGATIVE
LYMPHOCYTES # BLD AUTO: 2 X10E3/UL (ref 0.7–3.1)
LYMPHOCYTES NFR BLD AUTO: 35 %
MCH RBC QN AUTO: 30.2 PG (ref 26.6–33)
MCHC RBC AUTO-ENTMCNC: 33.7 G/DL (ref 31.5–35.7)
MCV RBC AUTO: 90 FL (ref 79–97)
MICRO URNS: NORMAL
MICRO URNS: NORMAL
MONOCYTES # BLD AUTO: 0.3 X10E3/UL (ref 0.1–0.9)
MONOCYTES NFR BLD AUTO: 6 %
NEUTROPHILS # BLD AUTO: 3.2 X10E3/UL (ref 1.4–7)
NEUTROPHILS NFR BLD AUTO: 56 %
NITRITE UR QL STRIP: NEGATIVE
PH UR STRIP: 5.5 [PH] (ref 5–7.5)
PLATELET # BLD AUTO: 284 X10E3/UL (ref 150–450)
POTASSIUM SERPL-SCNC: 5.1 MMOL/L (ref 3.5–5.2)
PROT SERPL-MCNC: 7 G/DL (ref 6–8.5)
PROT UR QL STRIP: NEGATIVE
PROTHROMBIN TIME: 10.2 SEC (ref 9.1–12)
RBC # BLD AUTO: 4.77 X10E6/UL (ref 3.77–5.28)
RBC #/AREA URNS HPF: NORMAL /HPF (ref 0–2)
SL AMB URINALYSIS REFLEX: NORMAL
SODIUM SERPL-SCNC: 141 MMOL/L (ref 134–144)
SP GR UR: 1.02 (ref 1–1.03)
UROBILINOGEN UR STRIP-ACNC: 0.2 MG/DL (ref 0.2–1)
WBC # BLD AUTO: 5.7 X10E3/UL (ref 3.4–10.8)
WBC #/AREA URNS HPF: NORMAL /HPF (ref 0–5)

## 2025-04-09 ENCOUNTER — PRE-ADMISSION TESTING (OUTPATIENT)
Dept: PREADMISSION TESTING | Facility: HOSPITAL | Age: 60
End: 2025-04-09
Payer: COMMERCIAL

## 2025-04-09 ENCOUNTER — TELEPHONE (OUTPATIENT)
Dept: OBGYN CLINIC | Facility: HOSPITAL | Age: 60
End: 2025-04-09

## 2025-04-09 NOTE — PRE-PROCEDURE INSTRUCTIONS
Pre-Surgery Instructions:   Medication Instructions    acetaminophen (TYLENOL) 650 mg CR tablet Uses PRN- OK to take day of surgery    ascorbic acid (VITAMIN C) 500 MG tablet Hold day of surgery.    Biotin 2.5 MG TABS Stop taking 7 days prior to surgery.    Cholecalciferol (VITAMIN D3) 1,000 units tablet Hold day of surgery.    ferrous sulfate 324 (65 Fe) mg Hold day of surgery.    folic acid (FOLVITE) 1 mg tablet Hold day of surgery.    levothyroxine 75 mcg tablet Take day of surgery.    mupirocin (BACTROBAN) 2 % ointment Instructions provided by MD    naproxen (Naprosyn) 500 mg tablet Stop taking 7 days prior to surgery.    zinc sulfate (ZINCATE) 220 mg capsule Hold day of surgery.    Medication instructions for day of surgery reviewed. Please take all instructed medications with only a sip of water.       You will receive a call one business day prior to surgery with an arrival time and hospital directions. If your surgery is scheduled on a Monday, the hospital will be calling you on the Friday prior to your surgery. If you have not heard from anyone by 8pm, please call the hospital supervisor through the hospital  at [phone]. ([place] [phone] or [place] [phone]).    Do not eat or drink anything after midnight the night before your surgery, including candy, mints, lifesavers, or chewing gum. Do not drink alcohol 24hrs before your surgery. Try not to smoke at least 24hrs before your surgery.       Follow the pre surgery showering instructions as listed in the “My Surgical Experience Booklet” or otherwise provided by your surgeon's office. Do not use a blade to shave the surgical area 1 week before surgery. It is okay to use a clean electric clippers up to 24 hours before surgery. Do not apply any lotions, creams, including makeup, cologne, deodorant, or perfumes after showering on the day of your surgery. Do not use dry shampoo, hair spray, hair gel, or any type of hair products.     No  contact lenses, eye make-up, or artificial eyelashes. Remove nail polish, including gel polish, and any artificial, gel, or acrylic nails if possible. Remove all jewelry including rings and body piercing jewelry.     Wear causal clothing that is easy to take on and off. Consider your type of surgery.    Keep any valuables, jewelry, piercings at home. Please bring any specially ordered equipment (sling, braces) if indicated.    Arrange for a responsible person to drive you to and from the hospital on the day of your surgery. Please confirm the visitor policy for the day of your procedure when you receive your phone call with an arrival time.     Call the surgeon's office with any new illnesses, exposures, or additional questions prior to surgery.    Please reference your “My Surgical Experience Booklet” for additional information to prepare for your upcoming surgery.

## 2025-04-10 ENCOUNTER — EVALUATION (OUTPATIENT)
Dept: PHYSICAL THERAPY | Facility: CLINIC | Age: 60
End: 2025-04-10
Attending: ORTHOPAEDIC SURGERY
Payer: COMMERCIAL

## 2025-04-10 ENCOUNTER — OFFICE VISIT (OUTPATIENT)
Dept: FAMILY MEDICINE CLINIC | Facility: CLINIC | Age: 60
End: 2025-04-10
Payer: COMMERCIAL

## 2025-04-10 ENCOUNTER — RA CDI HCC (OUTPATIENT)
Dept: OTHER | Facility: HOSPITAL | Age: 60
End: 2025-04-10

## 2025-04-10 VITALS
DIASTOLIC BLOOD PRESSURE: 80 MMHG | OXYGEN SATURATION: 98 % | SYSTOLIC BLOOD PRESSURE: 130 MMHG | HEART RATE: 108 BPM | BODY MASS INDEX: 35.04 KG/M2 | WEIGHT: 190.4 LBS | RESPIRATION RATE: 18 BRPM | TEMPERATURE: 97 F | HEIGHT: 62 IN

## 2025-04-10 DIAGNOSIS — E78.2 MIXED HYPERLIPIDEMIA: ICD-10-CM

## 2025-04-10 DIAGNOSIS — M17.11 PRIMARY OSTEOARTHRITIS OF RIGHT KNEE: ICD-10-CM

## 2025-04-10 DIAGNOSIS — Z00.00 ANNUAL PHYSICAL EXAM: ICD-10-CM

## 2025-04-10 DIAGNOSIS — G89.29 CHRONIC PAIN OF RIGHT KNEE: ICD-10-CM

## 2025-04-10 DIAGNOSIS — M17.11 PRIMARY OSTEOARTHRITIS OF RIGHT KNEE: Primary | ICD-10-CM

## 2025-04-10 DIAGNOSIS — M25.561 CHRONIC PAIN OF RIGHT KNEE: ICD-10-CM

## 2025-04-10 DIAGNOSIS — E03.9 ACQUIRED HYPOTHYROIDISM: ICD-10-CM

## 2025-04-10 DIAGNOSIS — Z01.818 PRE-OP TESTING: ICD-10-CM

## 2025-04-10 DIAGNOSIS — Z01.818 PRE-OP EXAM: ICD-10-CM

## 2025-04-10 PROBLEM — K21.9 GASTROESOPHAGEAL REFLUX DISEASE WITHOUT ESOPHAGITIS: Status: ACTIVE | Noted: 2025-04-10

## 2025-04-10 PROCEDURE — 99204 OFFICE O/P NEW MOD 45 MIN: CPT | Performed by: NURSE PRACTITIONER

## 2025-04-10 PROCEDURE — 99396 PREV VISIT EST AGE 40-64: CPT | Performed by: NURSE PRACTITIONER

## 2025-04-10 PROCEDURE — 97161 PT EVAL LOW COMPLEX 20 MIN: CPT

## 2025-04-10 PROCEDURE — 97110 THERAPEUTIC EXERCISES: CPT

## 2025-04-10 RX ORDER — ROSUVASTATIN CALCIUM 10 MG/1
TABLET, COATED ORAL
COMMUNITY
Start: 2025-04-08

## 2025-04-10 NOTE — PATIENT INSTRUCTIONS
"Patient Education     Routine physical for adults   The Basics   Written by the doctors and editors at [facility]   What is a physical? -- A physical is a routine visit, or \"check-up,\" with your doctor. You might also hear it called a \"wellness visit\" or \"preventive visit.\"  During each visit, the doctor will:   Ask about your physical and mental health   Ask about your habits, behaviors, and lifestyle   Do an exam   Give you vaccines if needed   Talk to you about any medicines you take   Give advice about your health   Answer your questions  Getting regular check-ups is an important part of taking care of your health. It can help your doctor find and treat any problems you have. But it's also important for preventing health problems.  A routine physical is different from a \"sick visit.\" A sick visit is when you see a doctor because of a health concern or problem. Since physicals are scheduled ahead of time, you can think about what you want to ask the doctor.  How often should I get a physical? -- It depends on your age and health. In general, for people age 21 years and older:   If you are younger than 50 years, you might be able to get a physical every 3 years.   If you are 50 years or older, your doctor might recommend a physical every year.  If you have an ongoing health condition, like diabetes or high blood pressure, your doctor will probably want to see you more often.  What happens during a physical? -- In general, each visit will include:   Physical exam - The doctor or nurse will check your height, weight, heart rate, and blood pressure. They will also look at your eyes and ears. They will ask about how you are feeling and whether you have any symptoms that bother you.   Medicines - It's a good idea to bring a list of all the medicines you take to each doctor visit. Your doctor will talk to you about your medicines and answer any questions. Tell them if you are having any side effects that bother you. You " "should also tell them if you are having trouble paying for any of your medicines.   Habits and behaviors - This includes:   Your diet   Your exercise habits   Whether you smoke, drink alcohol, or use drugs   Whether you are sexually active   Whether you feel safe at home  Your doctor will talk to you about things you can do to improve your health and lower your risk of health problems. They will also offer help and support. For example, if you want to quit smoking, they can give you advice and might prescribe medicines. If you want to improve your diet or get more physical activity, they can help you with this, too.   Lab tests, if needed - The tests you get will depend on your age and situation. For example, your doctor might want to check your:   Cholesterol   Blood sugar   Iron level   Vaccines - The recommended vaccines will depend on your age, health, and what vaccines you already had. Vaccines are very important because they can prevent certain serious or deadly infections.   Discussion of screening - \"Screening\" means checking for diseases or other health problems before they cause symptoms. Your doctor can recommend screening based on your age, risk, and preferences. This might include tests to check for:   Cancer, such as breast, prostate, cervical, ovarian, colorectal, prostate, lung, or skin cancer   Sexually transmitted infections, such as chlamydia and gonorrhea   Mental health conditions like depression and anxiety  Your doctor will talk to you about the different types of screening tests. They can help you decide which screenings to have. They can also explain what the results might mean.   Answering questions - The physical is a good time to ask the doctor or nurse questions about your health. If needed, they can refer you to other doctors or specialists, too.  Adults older than 65 years often need other care, too. As you get older, your doctor will talk to you about:   How to prevent falling at " home   Hearing or vision tests   Memory testing   How to take your medicines safely   Making sure that you have the help and support you need at home  All topics are updated as new evidence becomes available and our peer review process is complete.  This topic retrieved from [employer] on: May 02, 2024.  Topic 911737 Version 1.0  Release: 32.4.3 - C32.122  © 2024 UpToDate, Inc. and/or its affiliates. All rights reserved.  Consumer Information Use and Disclaimer   Disclaimer: This generalized information is a limited summary of diagnosis, treatment, and/or medication information. It is not meant to be comprehensive and should be used as a tool to help the user understand and/or assess potential diagnostic and treatment options. It does NOT include all information about conditions, treatments, medications, side effects, or risks that may apply to a specific patient. It is not intended to be medical advice or a substitute for the medical advice, diagnosis, or treatment of a health care provider based on the health care provider's examination and assessment of a patient's specific and unique circumstances. Patients must speak with a health care provider for complete information about their health, medical questions, and treatment options, including any risks or benefits regarding use of medications. This information does not endorse any treatments or medications as safe, effective, or approved for treating a specific patient. UpToDate, Inc. and its affiliates disclaim any warranty or liability relating to this information or the use thereof.The use of this information is governed by the Terms of Use, available at https://www.[employer] Softwareuwer.com/en/know/clinical-effectiveness-terms. 2024© UpToDate, Inc. and its affiliates and/or licensors. All rights reserved.  Copyright   © 2024 UpToDate, Inc. and/or its affiliates. All rights reserved.

## 2025-04-10 NOTE — PROGRESS NOTES
PT Evaluation     Today's date: 4/10/2025  Patient name: Keesha Griffith  : 1965  MRN: [id number]  Referring provider: Kamlesh Richter DO  Dx:   Encounter Diagnosis     ICD-10-CM    1. Primary osteoarthritis of right knee  M17.11 Ambulatory referral to Physical Therapy      2. Chronic pain of right knee  M25.561 Ambulatory referral to Physical Therapy    G89.29       3. Pre-op exam  Z01.818 Ambulatory referral to Physical Therapy                     Assessment  Impairments: abnormal gait, abnormal or restricted ROM, impaired balance, impaired physical strength, lacks appropriate home exercise program, pain with function and weight-bearing intolerance    Assessment details: Keesha Griffith is a 60 y.o. female who presents for pre-op exam for R TKE scheduled for 2025. Patient presents with R knee pain, decreased RLE strength, decreased R knee ROM, and ambulatory dysfunction. Due to these impairments, patient has difficulty performing ADL's, recreational activities, work-related activities, ambulation, stair negotiation, transfers. Patient's clinical presentation is consistent with their referring diagnosis of  Primary osteoarthritis of right knee and Chronic pain of right knee. Patient has been educated in post-op contraindications / precautions, gait training w/ RW and SPC, home exercise program, and plan of care. Patient would benefit from skilled physical therapy services to address their aforementioned functional limitations and progress towards prior level of function and independence with home exercise program.     Risk Assessment and Prediction Tool results reviewed with score of 9 . Discussed DOS and patient's questions were answered. Mobility/ROM and strength results in objective findings. Balance/Gait assessment reviewed. Home Preparation Checklist was reviewed with patient including identification of care partner and encouragement of single level set-up. Post-operative pain management expectations  discussed. Post-operative gait training for level ground, stairs, and car transfers was performed. Patient demonstrated competence with immediate post-operative home exercise program    Goals    Knee   Short Term Goals:  Target Date 4 weeks   STG1. Initiate and advance HEP to maximize progress between therapy sessions  STG2. Pt will demo AROM  B/L knee to 0-120 degrees to allow pt to transfer in/out of the car w/o difficulty   STG3. Improve B/L LE strength to 4/5 to allow pt to raise from sit to stand w/o UE assist.  STG4. Reduce pain w/ WB activity to 0-4/10 to improve functional independence.     Long Term Goals:  Target Date 12 weeks   LTG1. Pt to be Indep with HEP  to maximize progress between therapy sessions and improve functional mobility  LTG2. Pt will demo knee ROM of 0-130 or greater needed for reciprocal stairs w/ handrail w/o pain and to don shoes/socks independently.  LTG3. Pt to tolerate prolonged walking on uneven terrain w/o asst device.  LTG4. Pt to demo LE strength of 4+/5 or better such that pt can perform reciprocal stair negotiation  LTG5: Pt will improve standing tolerance to 30 min or more as per PLOF to return to work related tasks    Plan  Patient would benefit from: PT eval and skilled physical therapy  Planned modality interventions: cryotherapy, thermotherapy: hydrocollator packs and unattended electrical stimulation    Planned therapy interventions: balance/weight bearing training, joint mobilization, neuromuscular re-education, patient education, self care, therapeutic activities, therapeutic exercise, functional ROM exercises, home exercise program, IASTM, manual therapy, abdominal trunk stabilization, patient/caregiver education and postural training    Frequency: 2-3x week  Plan of Care beginning date: 4/10/2025  Plan of Care expiration date: 7/17/2025  Treatment plan discussed with: patient  Plan details: HEP development, stretching, strengthening, A/AA/PROM, joint mobilizations,  posture education, STM/MI as needed to reduce muscle tension, muscle reeducation, PLOC discussed and agreed upon with patient.         Subjective Evaluation    History of Present Illness  Mechanism of injury: Patient reports to PT for pre-op TKA evaluation. Patient reports she has had knee pain for a few years and is limited in some work tasks, ADLs, prolonged walking and standing due to pain. Patient struggles to full straighten knee and has pain with bending. Patient is scheduled for for TKA on 2025.   Patient Goals  Patient goals for therapy: decreased edema, decreased pain, improved balance, increased motion, independence with ADLs/IADLs, increased strength, return to sport/leisure activities and return to work    Pain  Current pain ratin  At best pain rating: 3  At worst pain ratin  Location: R knee  Quality: dull ache and sharp  Aggravating factors: walking, stair climbing and standing    Social Support  Steps to enter house: yes  12  Stairs in house: yes   12  Lives in: multiple-level home  Lives with: parents (daughter will be around often to help)    Employment status: working ()    Diagnostic Tests  Abnormal x-ray: Severe bilateral knee osteoarthritis predominantly in the medial compartments..        Objective    Observation:     Knee AROM: deg      R  L  Date:    4/10  4/10  Extension: (sup)   125  120  Flexion: (sup/prone)  0  -10      Strength: MMT  Hip    R  L    Date:     4/10  4/10   Flexion(sup)     5/  Abduction(S/L)    5    Knee    R  L    Date:     4/10  4/10  Extension(Seated)    5/  Flexion(Seated)    5    Ankle    R  L   Date:     4/10  4/10  Dorsiflexion(seated)    5/  Plantarflexion(seated)    5        Tenderness/Palpation: no TTP reported       Function    4/10/2025  Gait: mild antalgic gait pattern with with limited TKE in terminal stance and decreased stance time on RLE   Transfers: able to perform supine to/from sit and car  "transfers with no assistance   Stairs: patient demo good understanding of \"up with the good and down with the bad\" with stair negotiation w/ RW And SPC   Curb: patient demo good understanding of \"up with the good and down with the bad\" with stair negotiation w/ RW And SPC     TUG:          Precautions:   Past Medical History:   Diagnosis Date    Disease of thyroid gland     GERD (gastroesophageal reflux disease)     Hyperlipidemia      Past Surgical History:   Procedure Laterality Date    BREAST SURGERY Left     marker    CHOLECYSTECTOMY      lap    WISDOM TOOTH EXTRACTION         DOS: scheduled for 4/21/25  SOC: 4/10/2025  FOTO: TBA  POC Expiration: 7/17/2025  Daily Treatment Log:  Date 4/10/2025       Visit # 1       Auth         Auth exp        Manual                        There Exer        Bike for ROM         Quad set         Heel slides         Glute set         Ankle pumps                 Pt edu See assessment section        HEP Discussed pre-made pre-op HEP         There Activ        STS        FSU         LSU                                 NMReed        Hooklying clamshell         Weight shifts                                 Modalities                                HEP:          "

## 2025-04-10 NOTE — PROGRESS NOTES
Adult Annual Physical  Name: Keesha Griffith      : 1965      MRN: [id number]  Encounter Provider: JAZMINE Stark  Encounter Date: 4/10/2025   Encounter department: [facility]    :  Assessment & Plan  Primary osteoarthritis of right knee  Scheduled for surgery. Managed by ortho  Orders:    Ambulatory referral to [facility]    Chronic pain of right knee  Management by ortho  Orders:    Ambulatory referral to Family Practice    Acquired hypothyroidism  Levothyroxine. Currently being managed by gyn.        Mixed hyperlipidemia  Will be starting statin post op       Pre-op testing  Orders:    MRSA culture    Annual physical exam  Heart healthy, carbohydrate controlled diet- limit red meat, limit saturated fat, moderate salt intake, limit junk food, etc.   Regular exercise  Stress management  Routine labwork and screenings as ordered.              Preventive Screenings:  - Diabetes Screening: screening up-to-date  - Cholesterol Screening: has hyperlipidemia and risks/benefits discussed   - Hepatitis C screening: risks/benefits discussed   - HIV screening: risks/benefits discussed   - Cervical cancer screening: risks/benefits discussed   - Breast cancer screening: risks/benefits discussed   - Colon cancer screening: screening up-to-date   - Lung cancer screening: screening not indicated     Immunizations:  - Immunizations due: Influenza, Tdap and Zoster (Shingrix)    BMI Counseling: Body mass index is 34.55 kg/m². The BMI is above normal. Exercise recommendations include exercising 3-5 times per week.     Depression Screening and Follow-up Plan: Patient was screened for depression during today's encounter. They screened negative with a PHQ-2 score of 0.        BMI Counseling: Body mass index is 34.55 kg/m². The BMI is above normal. Nutrition recommendations include reducing portion sizes and decreasing overall calorie intake. Exercise recommendations include exercising 3-5 times  per week.    History of Present Illness     Adult Annual Physical:  Patient presents for annual physical. Here to establish care as new patient, physical,   Will be having knee replacement surgery . Will need pre op clearance. Needs to get mrsa swab because was not done when had pre op labs.   PMH, PSH, meds, allergies, SH, FH reviewed.   History: reviewed  FH: father- , dementia. Mother- chol, uterine cancer. Siblings healthy  SH: single, lives with mother. Works as . Non smoker. No etoh.   Current medications: reviewed  Current issues include: right knee pain.   Recently saw cardiology for pre op clearance. Chol was high so will be starting statins post op    .     Diet and Physical Activity:  - Diet/Nutrition: no special diet.  - Exercise: no formal exercise.    Depression Screening:  - PHQ-2 Score: 0    General Health:  - Sleep: 4-6 hours of sleep on average and sleeps well.  - Hearing: normal hearing bilateral ears and tinnitus.  - Vision: wears glasses and most recent eye exam < 1 year ago.  - Dental: regular dental visits, brushes teeth twice daily and floss regularly.    /GYN Health:  - Follows with GYN: yes.   - Menopause: postmenopausal.   - History of STDs: no    Advanced Care Planning:  - Has an advanced directive?: no    - Has a durable medical POA?: no    - ACP document given to patient?: yes      Review of Systems   Constitutional:  Negative for fever and unexpected weight change.   HENT:  Negative for congestion, sinus pressure and sinus pain.    Eyes:  Negative for visual disturbance.   Respiratory:  Negative for cough and shortness of breath.    Cardiovascular:  Negative for chest pain and palpitations.   Gastrointestinal:  Negative for abdominal pain, diarrhea, nausea and vomiting.   Endocrine: Negative for polydipsia and polyuria.   Genitourinary:  Negative for dysuria and frequency.   Musculoskeletal:  Positive for arthralgias (right knee). Negative for back pain  "(intermittent sciatica, right), myalgias and neck pain.   Skin:  Negative for rash and wound.   Allergic/Immunologic: Negative for immunocompromised state.   Neurological:  Negative for dizziness, seizures and headaches.   Hematological:  Does not bruise/bleed easily.   Psychiatric/Behavioral:  Negative for dysphoric mood. The patient is not nervous/anxious.          Objective   Resp 18   Ht 5' 2.25\" (1.581 m)   Wt 86.4 kg (190 lb 6.4 oz)   BMI 34.55 kg/m²     Recent Results (from the past 4 weeks)   Comprehensive metabolic panel    Collection Time: 04/07/25  7:59 AM   Result Value Ref Range    Glucose, Random 86 70 - 99 mg/dL    BUN 15 8 - 27 mg/dL    Creatinine 0.71 0.57 - 1.00 mg/dL    eGFR 97 >59 mL/min/1.73    SL AMB BUN/CREATININE RATIO 21 12 - 28    Sodium 141 134 - 144 mmol/L    Potassium 5.1 3.5 - 5.2 mmol/L    Chloride 103 96 - 106 mmol/L    CO2 22 20 - 29 mmol/L    CALCIUM 9.9 8.7 - 10.3 mg/dL    Protein, Total 7.0 6.0 - 8.5 g/dL    Albumin 4.6 3.8 - 4.9 g/dL    Globulin, Total 2.4 1.5 - 4.5 g/dL    TOTAL BILIRUBIN <0.2 0.0 - 1.2 mg/dL    Alk Phos Isoenzymes 115 44 - 121 IU/L    AST 30 0 - 40 IU/L    ALT 38 (H) 0 - 32 IU/L   CBC and differential    Collection Time: 04/07/25  7:59 AM   Result Value Ref Range    White Blood Cell Count 5.7 3.4 - 10.8 x10E3/uL    Red Blood Cell Count 4.77 3.77 - 5.28 x10E6/uL    Hemoglobin 14.4 11.1 - 15.9 g/dL    HCT 42.7 34.0 - 46.6 %    MCV 90 79 - 97 fL    MCH 30.2 26.6 - 33.0 pg    MCHC 33.7 31.5 - 35.7 g/dL    RDW 12.9 11.7 - 15.4 %    Platelet Count 284 150 - 450 x10E3/uL    Neutrophils 56 Not Estab. %    Lymphocytes 35 Not Estab. %    Monocytes 6 Not Estab. %    Eosinophils 2 Not Estab. %    Basophils PCT 1 Not Estab. %    Neutrophils (Absolute) 3.2 1.4 - 7.0 x10E3/uL    Lymphocytes (Absolute) 2.0 0.7 - 3.1 x10E3/uL    Monocytes (Absolute) 0.3 0.1 - 0.9 x10E3/uL    Eosinophils (Absolute) 0.1 0.0 - 0.4 x10E3/uL    Basophils ABS 0.0 0.0 - 0.2 x10E3/uL    Immature " Granulocytes 0 Not Estab. %    Immature Granulocytes (Absolute) 0.0 0.0 - 0.1 x10E3/uL   Protime-INR    Collection Time: 04/07/25  7:59 AM   Result Value Ref Range    INR 0.9 0.9 - 1.2    Prothrombin Time 10.2 9.1 - 12.0 sec   APTT    Collection Time: 04/07/25  7:59 AM   Result Value Ref Range    aPTT 25 24 - 33 sec   UA/M w/rflx Culture, Routine    Collection Time: 04/07/25  7:59 AM   Result Value Ref Range    Specific Gravity 1.021 1.005 - 1.030    Ph 5.5 5.0 - 7.5    Color UA Yellow Yellow    Urine Appearance Clear Clear    Leukocyte Esterase Negative Negative    Protein Negative Negative/Trace    Glucose, 24 HR Urine Negative Negative    Ketone, Urine Negative Negative    Blood, Urine Negative Negative    Bilirubin, Urine Negative Negative    Urobilinogen Urine 0.2 0.2 - 1.0 mg/dL    Nitrites Urine Negative Negative    Microscopic Examination Comment     Microscopic Examination See below:     Urinalysis Reflex Comment    Microscopic Examination    Collection Time: 04/07/25  7:59 AM   Result Value Ref Range    SL AMB WBC, URINE None seen 0 - 5 /hpf    RBC, Urine 0-2 0 - 2 /hpf    Epithelial Cells (non renal) None seen 0 - 10 /hpf    Casts None seen None seen /lpf    Bacteria, Urine None seen None seen/Few   Hemoglobin A1C    Collection Time: 04/07/25  7:59 AM   Result Value Ref Range    Hemoglobin A1C 5.1 4.8 - 5.6 %    Estimated Average Glucose 100 mg/dL     Reviewed lab/diagnostic results with pt including both normal and abnormal findings.   In depth counseling and instructions given. All questions answered during visit.     Physical Exam  Vitals reviewed.   Constitutional:       General: She is not in acute distress.     Appearance: Normal appearance. She is well-developed. She is not ill-appearing.   HENT:      Head: Normocephalic and atraumatic.      Right Ear: Tympanic membrane and ear canal normal.      Left Ear: Tympanic membrane and ear canal normal.      Nose: Nose normal.      Mouth/Throat:       Mouth: Mucous membranes are moist.      Pharynx: Oropharynx is clear.   Eyes:      General: No scleral icterus.     Extraocular Movements: Extraocular movements intact.      Pupils: Pupils are equal, round, and reactive to light.   Neck:      Thyroid: No thyromegaly.      Vascular: No carotid bruit.   Cardiovascular:      Rate and Rhythm: Normal rate and regular rhythm.      Heart sounds: Normal heart sounds. No murmur heard.  Pulmonary:      Effort: Pulmonary effort is normal. No respiratory distress.      Breath sounds: Normal breath sounds. No wheezing or rales.   Abdominal:      General: Bowel sounds are normal. There is no distension.      Palpations: Abdomen is soft.      Tenderness: There is no abdominal tenderness.   Musculoskeletal:         General: Normal range of motion.      Cervical back: Normal range of motion and neck supple.      Right lower leg: No edema.      Left lower leg: No edema.   Lymphadenopathy:      Cervical: No cervical adenopathy.   Skin:     General: Skin is warm and dry.      Coloration: Skin is not jaundiced or pale.   Neurological:      General: No focal deficit present.      Mental Status: She is alert and oriented to person, place, and time.      Cranial Nerves: No cranial nerve deficit.      Sensory: No sensory deficit.      Coordination: Coordination normal.      Gait: Gait normal.   Psychiatric:         Mood and Affect: Mood normal.         Behavior: Behavior normal.         Thought Content: Thought content normal.         Judgment: Judgment normal.

## 2025-04-10 NOTE — PROGRESS NOTES
HCC coding opportunities       Chart reviewed, no opportunity found: CHART REVIEWED, NO OPPORTUNITY FOUND        Patients Insurance        Commercial Insurance: [employer]

## 2025-04-11 ENCOUNTER — TELEPHONE (OUTPATIENT)
Dept: ADMINISTRATIVE | Facility: OTHER | Age: 60
End: 2025-04-11

## 2025-04-11 DIAGNOSIS — M17.11 PRIMARY OSTEOARTHRITIS OF RIGHT KNEE: ICD-10-CM

## 2025-04-11 DIAGNOSIS — G89.29 CHRONIC PAIN OF RIGHT KNEE: ICD-10-CM

## 2025-04-11 DIAGNOSIS — M25.561 CHRONIC PAIN OF RIGHT KNEE: ICD-10-CM

## 2025-04-11 RX ORDER — ZINC SULFATE 50(220)MG
220 CAPSULE ORAL DAILY
Qty: 30 CAPSULE | Refills: 0 | Status: SHIPPED | OUTPATIENT
Start: 2025-04-11 | End: 2025-05-11

## 2025-04-11 RX ORDER — ASCORBIC ACID 500 MG
500 TABLET ORAL 2 TIMES DAILY
Qty: 60 TABLET | Refills: 0 | Status: SHIPPED | OUTPATIENT
Start: 2025-04-11 | End: 2025-05-11

## 2025-04-11 RX ORDER — FERROUS SULFATE 324(65)MG
324 TABLET, DELAYED RELEASE (ENTERIC COATED) ORAL
Qty: 30 TABLET | Refills: 0 | Status: SHIPPED | OUTPATIENT
Start: 2025-04-11 | End: 2025-05-11

## 2025-04-11 RX ORDER — MUPIROCIN 20 MG/G
OINTMENT TOPICAL 2 TIMES DAILY
Qty: 15 G | Refills: 0 | Status: SHIPPED | OUTPATIENT
Start: 2025-04-11

## 2025-04-11 RX ORDER — FOLIC ACID 1 MG/1
1 TABLET ORAL DAILY
Qty: 30 TABLET | Refills: 0 | Status: SHIPPED | OUTPATIENT
Start: 2025-04-11 | End: 2025-05-11

## 2025-04-11 NOTE — TELEPHONE ENCOUNTER
----- Message from Tomasa CONTEH sent at 4/10/2025  5:28 PM EDT -----  04/10/25 5:28 PM    Hello, our patient Keesha Griffith has had Mammogram completed/performed. Please assist in updating the patient chart by making an External outreach to [facility] facility located in [facility]. The date of service is 12/2024.    Thank you,  KAREN Davidson PG

## 2025-04-11 NOTE — LETTER
Procedure Request Form: Mammogram      Date Requested: 25  Patient: Keesha Griffith  Patient : 1965   Referring Provider: Maranda Dickerson, JAZMINE        Date of Procedure ______________________________       The above patient has informed us that they have completed their   most recent Mammogram at your facility. Please complete   this form and attach all corresponding procedure reports/results.    Comments __________________________________________________________  ____________________________________________________________________  ____________________________________________________________________  ____________________________________________________________________    Facility Completing Procedure _________________________________________    Form Completed By (print name) _______________________________________      Signature __________________________________________________________      These reports are needed for  compliance.    Please fax this completed form and a copy of the procedure report to the [facility] Based Department as soon as possible via Fax [phone], aliya Haile: Phone [phone]. Our office is located at [address] [place] [address].    We thank you for your assistance in treating our mutual patient.

## 2025-04-11 NOTE — LETTER
Procedure Request Form: Mammogram 2nd request!      Date Requested: 25  Patient: Keesha Griffith  Patient : 1965   Referring Provider: JAZMINE Stark        Date of Procedure ______________________________       The above patient has informed us that they have completed their   most recent Mammogram at your facility. Please complete   this form and attach all corresponding procedure reports/results.    Comments __________________________________________________________  ____________________________________________________________________  ____________________________________________________________________  ____________________________________________________________________    Facility Completing Procedure _________________________________________    Form Completed By (print name) _______________________________________      Signature __________________________________________________________      These reports are needed for  compliance.    Please fax this completed form and a copy of the procedure report to the [facility] Based Department as soon as possible via Fax [phone], aliya Haile: Phone [phone]. Our office is located at [address] [place] [address].    We thank you for your assistance in treating our mutual patient.

## 2025-04-11 NOTE — LETTER
Procedure Request Form: Mammogram 3rd and final request!      Date Requested: 25  Patient: Keesha Griffith  Patient : 1965   Referring Provider: JAZMINE Stark        Date of Procedure ______________________________       The above patient has informed us that they have completed their   most recent Mammogram at your facility. Please complete   this form and attach all corresponding procedure reports/results.    Comments __________________________________________________________  ____________________________________________________________________  ____________________________________________________________________  ____________________________________________________________________    Facility Completing Procedure _________________________________________    Form Completed By (print name) _______________________________________      Signature __________________________________________________________      These reports are needed for  compliance.    Please fax this completed form and a copy of the procedure report to the [facility] Based Department as soon as possible via Fax [phone], aliya Haile: Phone [phone]. Our office is located at [address] [place] [address].    We thank you for your assistance in treating our mutual patient.

## 2025-04-11 NOTE — TELEPHONE ENCOUNTER
Upon review of the In Basket request and the patient's chart, initial outreach has been made via fax to facility. Please see Contacts section for details.     Thank you  Lazara Davis MA

## 2025-04-11 NOTE — LETTER
Procedure Request Form: Mammogram      Date Requested: 25  Patient: Keesha Griffith  Patient : 1965   Referring Provider: Maranda Dickerson, JAZMINE        Date of Procedure ______________________________       The above patient has informed us that they have completed their   most recent Mammogram at your facility. Please complete   this form and attach all corresponding procedure reports/results.    Comments __________________________________________________________  ____________________________________________________________________  ____________________________________________________________________  ____________________________________________________________________    Facility Completing Procedure _________________________________________    Form Completed By (print name) _______________________________________      Signature __________________________________________________________      These reports are needed for  compliance.    Please fax this completed form and a copy of the procedure report to the [facility] Based Department as soon as possible via Fax [phone], aliya Haile: Phone [phone]. Our office is located at [address] [place] [address].    We thank you for your assistance in treating our mutual patient.

## 2025-04-11 NOTE — TELEPHONE ENCOUNTER
Preoperative Elective Admission Assessment- spoke to patient     EKG/LAB/MRSA SWAB/CXR date: done     Living Situation:    Who does pt live with: relative (mother)  What kind of home: apartment (2nd story)   How do they enter the home: front  How many levels in home: 2 levels in apt   # of steps to enter home: 1 small step   # of steps to second floor: 10 steps to apartment, to bottom level of apt. And then additional flight of stairs in apartment that is roughly 8 steps.   Are there handrails: Yes  Are there landings: Yes  Sleeping arrangement: first/entry floor- recommend entry level for the first week   Where is Bathroom: second floor   Where is the tub or shower: tub shower   Toilet height? Concerns for low toilets - has elevated toilet   Dogs or ther pets: cat      First Floor Setup:   Is there a bathroom: No- has BSC   Where would pt sleep: recliner or couch first floor- bed second floor      DME: ordering RW; advised to bring dos   We discussed clearing pathways in the home and making sure there is accessibly to use the walker, for example, removing throw rugs.      Patient's Current Level of Function: Ambulates: Independently    Post-op Caregiver: child (daughter) and mother   Caregiver Name and phone number for Inpatient discharge needs: Renetta (daughter) [phone]  Currently receive any HHC/aides/community supports: No     Post-op Transport: child (daughter)   To/from hospital: child  To/from PT 2-3x/week: child  Uses community transport now: No     Outpatient Physical Therapy Site:  Site: [place]   pre and post-op appts scheduled? Yes     Medication Management: self  Preferred Pharmacy for Post-op Medications: [facility] [place] [address] [employer] [[id number]]   Blood Management Vitamin Regimen: Pt confirms taking as prescribed  Post-op anticoagulant: to be determined by surgical team postoperatively  Has Bactroban for 5 days preop: No  Educated on Preoperative Bathing  Instructions, and use of Soap for 5 days before surgery.      DC Plan: Pt plans to be discharged home      Barriers to DC identified preoperatively: home layout    BMI: 34.55    Patient Education:  Pt educated on post-op pain, early mobilization (POD0), LOS goals, OP PT goals, and preoperative bathing. Patient educated that our goal is to appropriately discharge patient based off their post-op function while striving to maintain maximal independence. The goal is to discharge patient to home and for them to attend outpatient physical therapy.    Assigned to care team? Yes

## 2025-04-12 LAB — MRSA SPEC QL CULT: NEGATIVE

## 2025-04-14 ENCOUNTER — APPOINTMENT (OUTPATIENT)
Dept: PHYSICAL THERAPY | Facility: CLINIC | Age: 60
End: 2025-04-14
Payer: COMMERCIAL

## 2025-04-16 ENCOUNTER — TELEPHONE (OUTPATIENT)
Dept: OBGYN CLINIC | Facility: CLINIC | Age: 60
End: 2025-04-16

## 2025-04-16 NOTE — DISCHARGE INSTR - AVS FIRST PAGE
Total Knee Replacement     WHAT YOU SHOULD KNOW:   Total knee replacement is surgery to replace a knee joint damaged by wear, injury, or disease. It is also called a total knee arthroplasty. The knee joint is where your femur (thigh bone) and tibia (large lower leg bone or shin bone) meet. A small bone called the patella (kneecap) protects your knee joint.            AFTER YOU LEAVE:     Medicines:   Pain medicine:  You may be given a prescription medicine to decrease pain. Do not wait until the pain is severe before you take this medicine. We recommend that you take Tylenol 975mg every 8 hours for baseline pain control. Oxycodone can be used as needed, but no more than 1-2 tablets every 6 hours.    NSAIDs:  These medicines decrease swelling, pain, and fever. NSAIDs are available without a doctor's order. Ask your primary healthcare provider which medicine is right for you. Ask how much to take and when to take it. Take as directed. NSAIDs can cause stomach bleeding and kidney problems if not taken correctly.  You will be given Celebrex 100 mg to take twice a day for the first 2 weeks after surgery.    Stool softeners  make it easier for you to have a bowel movement. You may need this medicine to treat or prevent constipation.  You will be given Colace 100mg to take twice a day    Anticoagulants  are a type of blood thinner medicine that helps prevent clots. Clots can cause strokes, heart attacks, and death. These medicines may cause you to bleed or bruise more easily.  You will be given aspirin 325 mg to take twice a day for 6 weeks after surgery.    Watch for bleeding from your gums or nose. Watch for blood in your urine and bowel movements. Use a soft washcloth and a soft toothbrush. If you shave, use an electric razor. Avoid activities that can cause bruising or bleeding.    Take your medicine as directed.  Call your healthcare provider if you think your medicine is not helping or if you have side effects. Tell  him if you are allergic to any medicine. Keep a list of the medicines, vitamins, and herbs you take. Include the amounts, and when and why you take them. Bring the list or the pill bottles to follow-up visits. Carry your medicine list with you in case of an emergency.    Follow up with your orthopedist as directed:  You may need to return to have your wound checked and stitches, staples, or drain removed. Write down your questions so you remember to ask them during your visits.  Please make an appointment to see Dr. Richter 2 weeks postoperatively.    Physical therapy:  A physical therapist teaches you exercises to help improve movement and strength, and to decrease pain. You will begin outpatient physical therapy later this week as planned.     Self-care:   Wound Care:  Please leave the Mepilex dressing in place for 7 days after surgery. After 7 days, you may remove the dressing and leave the incision open to air.  If the incision is uncomfortable under clothing after the Mepilex is removed, you may cover it with a a dry sterile dressing. Once the Mepilex dressing has been removed, please keep the incision dry for another week until your follow up appointment.    Use ice:  Ice helps decrease swelling and pain. Ice may also help prevent tissue damage. Use an ice pack or put crushed ice in a plastic bag. Cover it with a towel, and place it on your knee for 15 to 20 minutes every hour as directed.    Use a cane, walker, or crutches as directed:  These devices will help decrease your risk of falling. Your therapist will guide you about transitioning from a walker to cane to no assistive device.    Wear pressure stockings:  These are long, tight stockings that put pressure on your legs to promote blood flow and prevent clots. You may remove the stocking on your non-operative leg when you get home. The stocking on your operative leg should remain on for 2-3 days. Afterwards, you may put the stocking on or off as needed  for swelling.             Contact your orthopedist if:  You have a fever over 101.0°F.    You have trouble moving or bending your joint.    You have increased pain and swelling in your joint, even after you take pain medicine.    You have back pain or lower leg pain when you bend your foot upwards.    You have questions or concerns about your condition or care.    Blood soaks through/saturates your bandage.    Your incision comes apart.    Your incision is red, swollen, or draining pus.    You cannot walk or move your joint, or the limb is numb.    Your leg feels warm, tender, and painful. It may look swollen and red.     Seek immediate care or call 911 if:   You feel like you are going to faint.    You have a seizure or feel confused.     You feel lightheaded, short of breath, and have chest pain.    You have chest pain when you take a deep breath or cough. You may cough up blood.    © 2014 [employer]. Information is for End User's use only and may not be sold, redistributed or otherwise used for commercial purposes. All illustrations and images included in CareNotes® are the copyrighted property of [employer], [employer] or [employer].  The above information is an  only. It is not intended as medical advice for individual conditions or treatments. Talk to your doctor, nurse or pharmacist before following any medical regimen to see if it is safe and effective for you.

## 2025-04-16 NOTE — TELEPHONE ENCOUNTER
Pt called in requesting a shower chair for post op.  Can this be ordered for her with her insurance?      Also Pt takes ceterizine (Zyrtec) daily, is this ok to take daily until surgery?    Please advise

## 2025-04-16 NOTE — TELEPHONE ENCOUNTER
Shower bench ordered for patient at this time.    Patient states PAT advised her to stop taking Zyrtec. I advised patient to follow recommendations as instructed. No further questions at this time.

## 2025-04-16 NOTE — TELEPHONE ENCOUNTER
As a follow-up, a second attempt has been made for outreach via fax to facility. Please see Contacts section for details.    Thank you  Lazara Davis MA    Ok to schedule at 9 am tomorrow. Will need meter with data to be seen.

## 2025-04-17 ENCOUNTER — OFFICE VISIT (OUTPATIENT)
Dept: FAMILY MEDICINE CLINIC | Facility: CLINIC | Age: 60
End: 2025-04-17
Payer: COMMERCIAL

## 2025-04-17 VITALS
OXYGEN SATURATION: 98 % | RESPIRATION RATE: 18 BRPM | SYSTOLIC BLOOD PRESSURE: 128 MMHG | DIASTOLIC BLOOD PRESSURE: 80 MMHG | WEIGHT: 192 LBS | HEART RATE: 114 BPM | HEIGHT: 63 IN | BODY MASS INDEX: 34.02 KG/M2 | TEMPERATURE: 97 F

## 2025-04-17 DIAGNOSIS — Z01.818 PRE-OPERATIVE EXAMINATION: Primary | ICD-10-CM

## 2025-04-17 DIAGNOSIS — M17.11 PRIMARY OSTEOARTHRITIS OF RIGHT KNEE: ICD-10-CM

## 2025-04-17 LAB
DME PARACHUTE DELIVERY DATE ACTUAL: NORMAL
DME PARACHUTE DELIVERY DATE REQUESTED: NORMAL
DME PARACHUTE ITEM DESCRIPTION: NORMAL
DME PARACHUTE ORDER STATUS: NORMAL
DME PARACHUTE SUPPLIER NAME: NORMAL
DME PARACHUTE SUPPLIER PHONE: NORMAL

## 2025-04-17 PROCEDURE — 99214 OFFICE O/P EST MOD 30 MIN: CPT | Performed by: NURSE PRACTITIONER

## 2025-04-17 NOTE — PATIENT INSTRUCTIONS
Pre-operative Medication Instructions    Avoid herbs or non-directed vitamins one week prior to surgery  Avoid aspirin containing medications or non-steroidal anti-inflammatory drugs one week preceding surgery  May take tylenol for pain up until the night before surgery    Cholesterol lowering meds     Medication Name     rosuvastatin (CRESTOR) 10 MG tablet      Continue to take this medication on your normal schedule.  If this is an oral medication and you take it in the morning, then you may take this medicine with a sip of water.    Thyroid Medications     Medication Name     levothyroxine 75 mcg tablet      Continue to take this medication on your normal schedule.  If this is an oral medication and you take it in the morning, then you may take this medicine with a sip of water.

## 2025-04-17 NOTE — PROGRESS NOTES
Pre-operative Clearance  Name: Keesha Griffith      : 1965      MRN: [id number]  Encounter Provider: JAZMINE Stark  Encounter Date: 2025   Encounter department: [facility]    :  Assessment & Plan  Pre-operative examination  Medically cleared for surgery       Primary osteoarthritis of right knee  Management per ortho  Medically cleared for surgery which is scheduled for              Pre-operative Clearance:     Revised Cardiac Risk Index:  RCI RISK CLASS I (0 risk factors, risk of major cardiac complications approximately 0.5%)    Clearance:  Patient is medically optimized (CLEARED) for proposed surgery without any additional cardiac testing.      Medication Instructions:   - Avoid herbs or non-directed vitamins one week prior to surgery    - Avoid aspirin containing medications or non-steroidal anti-inflammatory drugs one week preceding surgery    - May take tylenol for pain up until the night before surgery    - Hyperlipidemia meds: rosuvastatin 10mg daily  - Thyroid meds: Continue to take this medication on your normal schedule.       History of Present Illness     Pre-Op Examination     Surgery: right total knee arthroplasty   Anticipated Date of Surgery: 2025   Surgeon: Dr Kamlesh Richter     Scheduled for right knee replacement on   Feels well  No recent illness  Was prescribed rosuvastatin by cardio but will not be starting until after surgery       Previous history of bleeding disorders or clots?: No    Previous Anesthesia reaction?: No    Prolonged steroid use in the last 6 months?: No      Assessment of Cardiac Risk:   - Unstable or severe angina or MI in the last 6 weeks or history of stent placement in the last year?: No    - Decompensated heart failure (e.g. New onset heart failure, NYHA  Class IV heart failure, or worsening existing heart failure)?: No    - Significant arrhythmias such as high grade AV block, symptomatic ventricular arrhythmia, newly  recognized ventricular tachycardia, supraventricular tachycardia with resting heart rate >100, or symptomatic bradycardia?: No    - Severe heart valve disease including aortic stenosis or symptomatic mitral stenosis?: No      Pre-operative Risk Factors:  - Elevated-risk surgery: No    - History of cerebrovascular disease: No    - History of ischemic heart disease: No    - History of congestive heart failure: No    - Pre-operative treatment with insulin: No    - Pre-operative creatinine >2 mg/dL: No      Duke Activity Status Index (DASI):    - DASI Total Score: 58.2   - METs: 9.9     Review of Systems   Constitutional:  Negative for fever.   HENT:  Negative for congestion, sinus pressure and sinus pain.    Eyes:  Negative for visual disturbance.   Respiratory:  Negative for cough, chest tightness and shortness of breath.    Cardiovascular:  Negative for chest pain and palpitations.   Gastrointestinal:  Negative for abdominal pain, diarrhea, nausea and vomiting.   Endocrine: Negative for polydipsia and polyuria.   Genitourinary:  Negative for dysuria and frequency.   Musculoskeletal:  Positive for arthralgias (right knee).   Skin:  Negative for rash and wound.   Allergic/Immunologic: Negative for immunocompromised state.   Neurological:  Negative for dizziness and headaches.   Hematological:  Negative for adenopathy.   Psychiatric/Behavioral:  Negative for dysphoric mood. The patient is not nervous/anxious.      Past Medical History   Past Medical History:   Diagnosis Date    Disease of thyroid gland     GERD (gastroesophageal reflux disease)     Hyperlipidemia      Past Surgical History:   Procedure Laterality Date    BREAST SURGERY Left     marker    CHOLECYSTECTOMY      lap    WISDOM TOOTH EXTRACTION       Family History   Problem Relation Age of Onset    Cancer Father     Hypertension Father      Social History     Tobacco Use    Smoking status: Never     Passive exposure: Past    Smokeless tobacco: Never   Vaping  "Use    Vaping status: Never Used   Substance and Sexual Activity    Alcohol use: Yes     Comment: occ    Drug use: Never    Sexual activity: Not on file     Current Outpatient Medications on File Prior to Visit   Medication Sig    acetaminophen (TYLENOL) 650 mg CR tablet Take 1 tablet (650 mg total) by mouth every 8 (eight) hours as needed for mild pain    ascorbic acid (VITAMIN C) 500 MG tablet Take 1 tablet (500 mg total) by mouth 2 (two) times a day    Biotin 2.5 MG TABS Take by mouth    Cholecalciferol (VITAMIN D3) 1,000 units tablet Take 1 tablet (1,000 Units total) by mouth daily    ferrous sulfate 324 (65 Fe) mg Take 1 tablet (324 mg total) by mouth daily before breakfast    folic acid (FOLVITE) 1 mg tablet Take 1 tablet (1 mg total) by mouth daily    levothyroxine 75 mcg tablet Take 75 mcg by mouth daily Unsure of dose    mupirocin (BACTROBAN) 2 % ointment Apply topically 2 (two) times a day    zinc sulfate (ZINCATE) 220 mg capsule Take 1 capsule (220 mg total) by mouth daily    naproxen (Naprosyn) 500 mg tablet Take 1 tablet (500 mg total) by mouth 2 (two) times a day with meals    rosuvastatin (CRESTOR) 10 MG tablet Will start after surgery (Patient not taking: Reported on 4/17/2025)     No Known Allergies  Objective   /80   Pulse (!) 114   Temp (!) 97 °F (36.1 °C)   Resp 18   Ht 5' 2.5\" (1.588 m)   Wt 87.1 kg (192 lb)   SpO2 98%   BMI 34.56 kg/m²     Physical Exam  Vitals reviewed.   Constitutional:       General: She is not in acute distress.     Appearance: Normal appearance. She is well-developed. She is not ill-appearing.   HENT:      Head: Normocephalic and atraumatic.      Right Ear: Tympanic membrane and ear canal normal.      Left Ear: Tympanic membrane and ear canal normal.      Nose: Nose normal.      Mouth/Throat:      Mouth: Mucous membranes are moist.      Pharynx: Oropharynx is clear.   Eyes:      General: No scleral icterus.     Extraocular Movements: Extraocular movements " intact.      Pupils: Pupils are equal, round, and reactive to light.   Neck:      Thyroid: No thyromegaly.      Vascular: No carotid bruit.   Cardiovascular:      Rate and Rhythm: Normal rate and regular rhythm.      Heart sounds: Normal heart sounds. No murmur heard.  Pulmonary:      Effort: Pulmonary effort is normal. No respiratory distress.      Breath sounds: Normal breath sounds. No wheezing or rales.   Abdominal:      General: Bowel sounds are normal. There is no distension.      Palpations: Abdomen is soft.      Tenderness: There is no abdominal tenderness.   Musculoskeletal:         General: Normal range of motion.      Cervical back: Normal range of motion and neck supple.      Right lower leg: No edema.      Left lower leg: No edema.   Lymphadenopathy:      Cervical: No cervical adenopathy.   Skin:     General: Skin is warm and dry.      Coloration: Skin is not jaundiced or pale.   Neurological:      General: No focal deficit present.      Mental Status: She is alert and oriented to person, place, and time.      Cranial Nerves: No cranial nerve deficit.      Sensory: No sensory deficit.      Coordination: Coordination normal.      Gait: Gait normal.   Psychiatric:         Mood and Affect: Mood normal.         Behavior: Behavior normal.         Thought Content: Thought content normal.         Judgment: Judgment normal.           JAZMINE Stark

## 2025-04-18 ENCOUNTER — ANESTHESIA EVENT (OUTPATIENT)
Dept: PERIOP | Facility: HOSPITAL | Age: 60
End: 2025-04-18
Payer: COMMERCIAL

## 2025-04-21 ENCOUNTER — HOSPITAL ENCOUNTER (OUTPATIENT)
Facility: HOSPITAL | Age: 60
Setting detail: OUTPATIENT SURGERY
Discharge: HOME | End: 2025-04-21
Attending: ORTHOPAEDIC SURGERY | Admitting: ORTHOPAEDIC SURGERY
Payer: COMMERCIAL

## 2025-04-21 ENCOUNTER — APPOINTMENT (OUTPATIENT)
Dept: RADIOLOGY | Facility: HOSPITAL | Age: 60
End: 2025-04-21
Payer: COMMERCIAL

## 2025-04-21 ENCOUNTER — ANESTHESIA (OUTPATIENT)
Dept: PERIOP | Facility: HOSPITAL | Age: 60
End: 2025-04-21
Payer: COMMERCIAL

## 2025-04-21 VITALS
TEMPERATURE: 97.6 F | HEART RATE: 87 BPM | DIASTOLIC BLOOD PRESSURE: 87 MMHG | OXYGEN SATURATION: 98 % | RESPIRATION RATE: 18 BRPM | SYSTOLIC BLOOD PRESSURE: 139 MMHG

## 2025-04-21 DIAGNOSIS — M17.11 PRIMARY OSTEOARTHRITIS OF RIGHT KNEE: Primary | ICD-10-CM

## 2025-04-21 LAB — GLUCOSE SERPL-MCNC: 112 MG/DL (ref 65–140)

## 2025-04-21 PROCEDURE — S2900 ROBOTIC SURGICAL SYSTEM: HCPCS | Performed by: ORTHOPAEDIC SURGERY

## 2025-04-21 PROCEDURE — C1776 JOINT DEVICE (IMPLANTABLE): HCPCS | Performed by: ORTHOPAEDIC SURGERY

## 2025-04-21 PROCEDURE — 82948 REAGENT STRIP/BLOOD GLUCOSE: CPT

## 2025-04-21 PROCEDURE — 97167 OT EVAL HIGH COMPLEX 60 MIN: CPT

## 2025-04-21 PROCEDURE — 97163 PT EVAL HIGH COMPLEX 45 MIN: CPT

## 2025-04-21 PROCEDURE — 97110 THERAPEUTIC EXERCISES: CPT

## 2025-04-21 PROCEDURE — 27447 TOTAL KNEE ARTHROPLASTY: CPT | Performed by: PHYSICIAN ASSISTANT

## 2025-04-21 PROCEDURE — 73560 X-RAY EXAM OF KNEE 1 OR 2: CPT

## 2025-04-21 PROCEDURE — C1713 ANCHOR/SCREW BN/BN,TIS/BN: HCPCS | Performed by: ORTHOPAEDIC SURGERY

## 2025-04-21 PROCEDURE — 97535 SELF CARE MNGMENT TRAINING: CPT

## 2025-04-21 PROCEDURE — 27447 TOTAL KNEE ARTHROPLASTY: CPT | Performed by: ORTHOPAEDIC SURGERY

## 2025-04-21 DEVICE — ATTUNE PATELLA MEDIALIZED DOME 35MM CEMENTED AOX
Type: IMPLANTABLE DEVICE | Site: KNEE | Status: FUNCTIONAL
Brand: ATTUNE

## 2025-04-21 DEVICE — ATTUNE KNEE SYSTEM TIBIAL INSERT FIXED BEARING MEDIAL STABILIZED RIGHT AOX 5, 5MM
Type: IMPLANTABLE DEVICE | Site: KNEE | Status: FUNCTIONAL
Brand: ATTUNE

## 2025-04-21 DEVICE — ATTUNE KNEE SYSTEM TIBIAL BASE AFFIXIUM FIXED BEARING SIZE 4
Type: IMPLANTABLE DEVICE | Site: KNEE | Status: FUNCTIONAL
Brand: ATTUNE AFFIXIUM

## 2025-04-21 DEVICE — PALACOS® R IS A FAST-CURING, RADIOPAQUE, POLY(METHYL METHACRYLATE)-BASED BONE CEMENT.PALACOS ® R CONTAINS THE X-RAY CONTRAST MEDIUM ZIRCONIUM DIOXIDE. TO IMPROVE VISIBILITY IN THE SURGICAL FIELD PALACOS ® R HAS BEEN COLOURED WITH CHLOROPHYLL (E141). THE BONE CEMENT IS PREPARED DIRECTLY BEFORE USE BY MIXING A POLYMER POWDER COMPONENT WITH A LIQUID MONOMER COMPONENT. A DUCTILE DOUGH FORMS WHICH CURES WITHIN A FEW MINUTES.
Type: IMPLANTABLE DEVICE | Site: KNEE | Status: FUNCTIONAL
Brand: PALACOS®

## 2025-04-21 DEVICE — ATTUNE KNEE SYSTEM FEMORAL POROCOAT CRUCIATE RETAINING NARROW SIZE 5N RIGHT CEMENTLESS
Type: IMPLANTABLE DEVICE | Site: KNEE | Status: FUNCTIONAL
Brand: ATTUNE

## 2025-04-21 RX ORDER — CEFAZOLIN SODIUM 2 G/50ML
2000 SOLUTION INTRAVENOUS ONCE
Status: COMPLETED | OUTPATIENT
Start: 2025-04-21 | End: 2025-04-21

## 2025-04-21 RX ORDER — MAGNESIUM HYDROXIDE 1200 MG/15ML
LIQUID ORAL AS NEEDED
Status: DISCONTINUED | OUTPATIENT
Start: 2025-04-21 | End: 2025-04-21 | Stop reason: HOSPADM

## 2025-04-21 RX ORDER — PROPOFOL 10 MG/ML
INJECTION, EMULSION INTRAVENOUS AS NEEDED
Status: DISCONTINUED | OUTPATIENT
Start: 2025-04-21 | End: 2025-04-21

## 2025-04-21 RX ORDER — ASPIRIN 325 MG
325 TABLET ORAL 2 TIMES DAILY
Qty: 84 TABLET | Refills: 0 | Status: SHIPPED | OUTPATIENT
Start: 2025-04-21 | End: 2025-06-02

## 2025-04-21 RX ORDER — SODIUM CHLORIDE 9 MG/ML
INJECTION, SOLUTION INTRAVENOUS AS NEEDED
Status: DISCONTINUED | OUTPATIENT
Start: 2025-04-21 | End: 2025-04-21 | Stop reason: HOSPADM

## 2025-04-21 RX ORDER — CELECOXIB 100 MG/1
100 CAPSULE ORAL 2 TIMES DAILY
Qty: 28 CAPSULE | Refills: 0 | Status: SHIPPED | OUTPATIENT
Start: 2025-04-21 | End: 2025-05-05

## 2025-04-21 RX ORDER — CELECOXIB 100 MG/1
100 CAPSULE ORAL 2 TIMES DAILY
Status: DISCONTINUED | OUTPATIENT
Start: 2025-04-21 | End: 2025-04-21 | Stop reason: HOSPADM

## 2025-04-21 RX ORDER — OXYCODONE HCL 10 MG/1
10 TABLET, FILM COATED, EXTENDED RELEASE ORAL ONCE
Status: COMPLETED | OUTPATIENT
Start: 2025-04-21 | End: 2025-04-21

## 2025-04-21 RX ORDER — GABAPENTIN 300 MG/1
300 CAPSULE ORAL ONCE
Status: COMPLETED | OUTPATIENT
Start: 2025-04-21 | End: 2025-04-21

## 2025-04-21 RX ORDER — CHLORHEXIDINE GLUCONATE ORAL RINSE 1.2 MG/ML
15 SOLUTION DENTAL ONCE
Status: COMPLETED | OUTPATIENT
Start: 2025-04-21 | End: 2025-04-21

## 2025-04-21 RX ORDER — LIDOCAINE HYDROCHLORIDE 10 MG/ML
INJECTION, SOLUTION EPIDURAL; INFILTRATION; INTRACAUDAL; PERINEURAL AS NEEDED
Status: DISCONTINUED | OUTPATIENT
Start: 2025-04-21 | End: 2025-04-21

## 2025-04-21 RX ORDER — OXYCODONE HYDROCHLORIDE 5 MG/1
10 TABLET ORAL EVERY 4 HOURS PRN
Status: DISCONTINUED | OUTPATIENT
Start: 2025-04-21 | End: 2025-04-21 | Stop reason: HOSPADM

## 2025-04-21 RX ORDER — DOCUSATE SODIUM 100 MG/1
100 CAPSULE, LIQUID FILLED ORAL 2 TIMES DAILY
Qty: 60 CAPSULE | Refills: 0 | Status: SHIPPED | OUTPATIENT
Start: 2025-04-21 | End: 2025-05-21

## 2025-04-21 RX ORDER — ONDANSETRON 2 MG/ML
4 INJECTION INTRAMUSCULAR; INTRAVENOUS EVERY 6 HOURS PRN
Status: DISCONTINUED | OUTPATIENT
Start: 2025-04-21 | End: 2025-04-21 | Stop reason: HOSPADM

## 2025-04-21 RX ORDER — MAGNESIUM HYDROXIDE/ALUMINUM HYDROXICE/SIMETHICONE 120; 1200; 1200 MG/30ML; MG/30ML; MG/30ML
30 SUSPENSION ORAL EVERY 6 HOURS PRN
Status: DISCONTINUED | OUTPATIENT
Start: 2025-04-21 | End: 2025-04-21 | Stop reason: HOSPADM

## 2025-04-21 RX ORDER — ACETAMINOPHEN 325 MG/1
975 TABLET ORAL EVERY 8 HOURS
Status: DISCONTINUED | OUTPATIENT
Start: 2025-04-21 | End: 2025-04-21 | Stop reason: HOSPADM

## 2025-04-21 RX ORDER — PROPOFOL 10 MG/ML
INJECTION, EMULSION INTRAVENOUS CONTINUOUS PRN
Status: DISCONTINUED | OUTPATIENT
Start: 2025-04-21 | End: 2025-04-21

## 2025-04-21 RX ORDER — BUPIVACAINE HYDROCHLORIDE 2.5 MG/ML
INJECTION, SOLUTION EPIDURAL; INFILTRATION; INTRACAUDAL; PERINEURAL AS NEEDED
Status: DISCONTINUED | OUTPATIENT
Start: 2025-04-21 | End: 2025-04-21 | Stop reason: HOSPADM

## 2025-04-21 RX ORDER — ONDANSETRON 2 MG/ML
INJECTION INTRAMUSCULAR; INTRAVENOUS AS NEEDED
Status: DISCONTINUED | OUTPATIENT
Start: 2025-04-21 | End: 2025-04-21

## 2025-04-21 RX ORDER — HYDROMORPHONE HCL/PF 1 MG/ML
0.5 SYRINGE (ML) INJECTION EVERY 2 HOUR PRN
Status: DISCONTINUED | OUTPATIENT
Start: 2025-04-21 | End: 2025-04-21 | Stop reason: HOSPADM

## 2025-04-21 RX ORDER — OXYCODONE HYDROCHLORIDE 5 MG/1
5 TABLET ORAL EVERY 4 HOURS PRN
Status: DISCONTINUED | OUTPATIENT
Start: 2025-04-21 | End: 2025-04-21 | Stop reason: HOSPADM

## 2025-04-21 RX ORDER — GABAPENTIN 100 MG/1
200 CAPSULE ORAL 2 TIMES DAILY
Status: DISCONTINUED | OUTPATIENT
Start: 2025-04-21 | End: 2025-04-21 | Stop reason: HOSPADM

## 2025-04-21 RX ORDER — TRANEXAMIC ACID 10 MG/ML
1000 INJECTION, SOLUTION INTRAVENOUS ONCE
Status: COMPLETED | OUTPATIENT
Start: 2025-04-21 | End: 2025-04-21

## 2025-04-21 RX ORDER — BUPIVACAINE HYDROCHLORIDE 7.5 MG/ML
INJECTION, SOLUTION INTRASPINAL AS NEEDED
Status: DISCONTINUED | OUTPATIENT
Start: 2025-04-21 | End: 2025-04-21

## 2025-04-21 RX ORDER — ACETAMINOPHEN 325 MG/1
975 TABLET ORAL ONCE
Status: COMPLETED | OUTPATIENT
Start: 2025-04-21 | End: 2025-04-21

## 2025-04-21 RX ORDER — SODIUM CHLORIDE, SODIUM LACTATE, POTASSIUM CHLORIDE, CALCIUM CHLORIDE 600; 310; 30; 20 MG/100ML; MG/100ML; MG/100ML; MG/100ML
125 INJECTION, SOLUTION INTRAVENOUS CONTINUOUS
Status: DISCONTINUED | OUTPATIENT
Start: 2025-04-21 | End: 2025-04-21 | Stop reason: HOSPADM

## 2025-04-21 RX ORDER — OXYCODONE HYDROCHLORIDE 5 MG/1
5 TABLET ORAL EVERY 4 HOURS PRN
Qty: 40 TABLET | Refills: 0 | Status: SHIPPED | OUTPATIENT
Start: 2025-04-21

## 2025-04-21 RX ORDER — ACETAMINOPHEN 325 MG/1
975 TABLET ORAL EVERY 8 HOURS
Qty: 63 TABLET | Refills: 0 | Status: SHIPPED | OUTPATIENT
Start: 2025-04-21 | End: 2025-04-28

## 2025-04-21 RX ORDER — BUPIVACAINE HYDROCHLORIDE 5 MG/ML
INJECTION, SOLUTION EPIDURAL; INTRACAUDAL; PERINEURAL
Status: DISCONTINUED | OUTPATIENT
Start: 2025-04-21 | End: 2025-04-21

## 2025-04-21 RX ORDER — BISACODYL 10 MG
10 SUPPOSITORY, RECTAL RECTAL DAILY PRN
Status: DISCONTINUED | OUTPATIENT
Start: 2025-04-21 | End: 2025-04-21 | Stop reason: HOSPADM

## 2025-04-21 RX ORDER — MIDAZOLAM HYDROCHLORIDE 2 MG/2ML
INJECTION, SOLUTION INTRAMUSCULAR; INTRAVENOUS AS NEEDED
Status: DISCONTINUED | OUTPATIENT
Start: 2025-04-21 | End: 2025-04-21

## 2025-04-21 RX ORDER — DOCUSATE SODIUM 100 MG/1
100 CAPSULE, LIQUID FILLED ORAL 2 TIMES DAILY
Status: DISCONTINUED | OUTPATIENT
Start: 2025-04-21 | End: 2025-04-21 | Stop reason: HOSPADM

## 2025-04-21 RX ORDER — DEXAMETHASONE SODIUM PHOSPHATE 10 MG/ML
INJECTION, SOLUTION INTRAMUSCULAR; INTRAVENOUS AS NEEDED
Status: DISCONTINUED | OUTPATIENT
Start: 2025-04-21 | End: 2025-04-21

## 2025-04-21 RX ADMIN — DEXAMETHASONE SODIUM PHOSPHATE 10 MG: 10 INJECTION, SOLUTION INTRAMUSCULAR; INTRAVENOUS at 07:39

## 2025-04-21 RX ADMIN — MIDAZOLAM 2 MG: 1 INJECTION INTRAMUSCULAR; INTRAVENOUS at 07:27

## 2025-04-21 RX ADMIN — OXYCODONE HYDROCHLORIDE 10 MG: 10 TABLET, FILM COATED, EXTENDED RELEASE ORAL at 06:33

## 2025-04-21 RX ADMIN — PROPOFOL 100 MCG/KG/MIN: 10 INJECTION, EMULSION INTRAVENOUS at 07:38

## 2025-04-21 RX ADMIN — CEFAZOLIN SODIUM 2000 MG: 2 SOLUTION INTRAVENOUS at 07:32

## 2025-04-21 RX ADMIN — OXYCODONE HYDROCHLORIDE 5 MG: 5 TABLET ORAL at 13:04

## 2025-04-21 RX ADMIN — PROPOFOL 70 MG: 10 INJECTION, EMULSION INTRAVENOUS at 07:38

## 2025-04-21 RX ADMIN — SODIUM CHLORIDE, SODIUM LACTATE, POTASSIUM CHLORIDE, AND CALCIUM CHLORIDE 125 ML/HR: .6; .31; .03; .02 INJECTION, SOLUTION INTRAVENOUS at 11:15

## 2025-04-21 RX ADMIN — BUPIVACAINE HYDROCHLORIDE 10 ML: 5 INJECTION, SOLUTION EPIDURAL; INTRACAUDAL; PERINEURAL at 10:06

## 2025-04-21 RX ADMIN — SODIUM CHLORIDE, SODIUM LACTATE, POTASSIUM CHLORIDE, AND CALCIUM CHLORIDE: .6; .31; .03; .02 INJECTION, SOLUTION INTRAVENOUS at 08:29

## 2025-04-21 RX ADMIN — ACETAMINOPHEN 975 MG: 325 TABLET ORAL at 13:35

## 2025-04-21 RX ADMIN — LIDOCAINE HYDROCHLORIDE 50 MG: 10 INJECTION, SOLUTION EPIDURAL; INFILTRATION; INTRACAUDAL; PERINEURAL at 07:38

## 2025-04-21 RX ADMIN — ACETAMINOPHEN 975 MG: 325 TABLET ORAL at 06:33

## 2025-04-21 RX ADMIN — GABAPENTIN 300 MG: 300 CAPSULE ORAL at 06:33

## 2025-04-21 RX ADMIN — CHLORHEXIDINE GLUCONATE 15 ML: 1.2 SOLUTION ORAL at 06:33

## 2025-04-21 RX ADMIN — TRANEXAMIC ACID 1000 MG: 10 INJECTION, SOLUTION INTRAVENOUS at 07:39

## 2025-04-21 RX ADMIN — BUPIVACAINE 10 ML: 13.3 INJECTION, SUSPENSION, LIPOSOMAL INFILTRATION at 10:06

## 2025-04-21 RX ADMIN — CEFAZOLIN SODIUM 2000 MG: 2 SOLUTION INTRAVENOUS at 13:05

## 2025-04-21 RX ADMIN — BUPIVACAINE HYDROCHLORIDE IN DEXTROSE 1.4 ML: 7.5 INJECTION, SOLUTION SUBARACHNOID at 07:35

## 2025-04-21 RX ADMIN — ONDANSETRON 4 MG: 2 INJECTION INTRAMUSCULAR; INTRAVENOUS at 07:39

## 2025-04-21 RX ADMIN — SODIUM CHLORIDE, SODIUM LACTATE, POTASSIUM CHLORIDE, AND CALCIUM CHLORIDE 125 ML/HR: .6; .31; .03; .02 INJECTION, SOLUTION INTRAVENOUS at 06:43

## 2025-04-21 NOTE — OCCUPATIONAL THERAPY NOTE
Occupational Therapy Evaluation/Treatment       04/21/25 1336   OT Last Visit   OT Visit Date 04/21/25   Note Type   Note type Evaluation   Pain Assessment   Pain Assessment Tool 0-10   Pain Score 5   Pain Location/Orientation Orientation: Right;Location: Knee   Restrictions/Precautions   RLE Weight Bearing Per Order WBAT   Other Precautions Fall Risk;Pain   Home Living   Type of Home House   Home Layout Two level  (One-step to enter, 8 steps to the first level and 10 steps to the second level.  Patient is set up to stay on the first level with a commode due to the bathroom being on the second floor)   Bathroom Shower/Tub Tub/shower unit   Bathroom Toilet Raised   Bathroom Equipment Commode   Home Equipment Walker;Cane   Prior Function   Level of [place] Independent with functional mobility;Independent with ADLs;Independent with IADLS   Lives With Family  (mother)   Receives Help From Family  (daughter plans to stay with patient upon discharge)   IADLs Independent with driving;Independent with meal prep;Independent with medication management   Vocational Full time employment  ()   Comments Patient is POD#0 s/p R TKA   Lifestyle   Reciprocal Relationships daughter present at start of session   ADL   Eating Assistance 7  Independent   Grooming Assistance 7  Independent   UB Bathing Assistance 7  Independent   LB Bathing Assistance 4  Minimal Assistance   UB Dressing Assistance 7  Independent   LB Dressing Assistance 5  Supervision/Setup   Toileting Assistance  5  Supervision/Setup   Transfers   Sit to Stand 4  Minimal assistance   Stand to Sit 5  Supervision   Functional Mobility   Functional Mobility 4  Minimal assistance   Additional Comments short distance to commode with RW   Balance   Static Sitting Fair +   Dynamic Sitting Fair   Static Standing Fair   Dynamic Standing Fair -   Activity Tolerance   Activity Tolerance Patient limited by pain;Patient limited by fatigue   Nurse Made Aware yes    RUE Assessment   RUE Assessment WFL   LUE Assessment   LUE Assessment WFL   Cognition   Overall Cognitive Status WFL   Arousal/Participation Cooperative   Attention Within functional limits   Orientation Level Oriented X4   Following Commands Follows all commands and directions without difficulty   Assessment   Limitation Decreased ADL status;Decreased Safe judgement during ADL;Decreased endurance;Decreased self-care trans;Decreased high-level ADLs  (decreased balance and mobility)   Prognosis Good   Assessment Patient evaluated by Occupational Therapy.  Patient admitted with Primary osteoarthritis of right knee.  The patients occupational profile, medical and therapy history includes a extensive additional review of physical, cognitive, or psychosocial history related to current functional performance.  Comorbidities affecting functional mobility and ADLS include: GERD.  Prior to admission, patient was independent with functional mobility without assistive device, independent with ADLS, and independent with IADLS.  The evaluation identifies the following performance deficits: weakness, impaired balance, decreased endurance, increased fall risk, new onset of impairment of functional mobility, decreased ADLS, decreased IADLS, pain, decreased activity tolerance, decreased safety awareness, and decreased strength, that result in activity limitations and/or participation restrictions. This evaluation requires clinical decision making of high complexity, because the patient presents with comorbidites that affect occupational performance and required significant modification of tasks or assistance with consideration of multiple treatment options.  The Barthel Index was used as a functional outcome tool presenting with a score of Barthel Index Score: 60, indicating marked limitations of functional mobility and ADLS.  The patient's raw score on the -PAC Daily Activity Inpatient Short Form is 21. A raw score of greater  than or equal to 19 suggests the patient may benefit from discharge to home. Please refer to the recommendation of the Occupational Therapist for safe discharge planning.  Patient will benefit from skilled Occupational Therapy services to address above deficits and facilitate a safe return to prior level of function.   Goals   Patient Goals to go home   STG Time Frame   (1-7 days)   Short Term Goal  Goals established to promote Patient Goals: to go home:  Grooming: supervision standing at sink; Bathing: supervision; Lower Body Dressing: supervision; Toileting: independent; Patient will increase ambulatory standard toilet transfer to independent with rolling walker to increase performance and safety with ADLS and functional mobility; Patient will increase standing tolerance to 5 minutes during ADL task to decrease assistance level and decrease fall risk; Patient will increase bed mobility to supervision in preparation for ADLS and transfers; Patient will increase functional mobility to and from bathroom with rolling walker with supervision to increase performance with ADLS and to use a toilet;  Patient will improve functional activity tolerance to 10 minutes of sustained functional tasks to increase participation in basic self-care and decrease assistance level;  Patient will increase dynamic standing balance to fair+ to improve postural stability and decrease fall risk during standing ADLS and transfers.   LTG Time Frame   (8-14 days)   Long Term Goal Grooming: independent standing at sink; Bathing: independent; Lower Body Dressing: independent; Patient will increase standing tolerance to 10 minutes during ADL task to decrease assistance level and decrease fall risk; Patient will increase bed mobility to independent in preparation for ADLS and transfers; Patient will increase functional mobility to and from bathroom with rolling walker independently to increase performance with ADLS and to use a toilet;  Patient  will improve functional activity tolerance to 20 minutes of sustained functional tasks to increase participation in basic self-care and decrease assistance level;  Patient will increase dynamic standing balance to good to improve postural stability and decrease fall risk during standing ADLS and transfers.   Pt will score >/= 24/24 on AM-PAC Daily Activity Inpatient scale to promote safe independence with ADLs and functional mobility; Pt will score >/= 90/100 on Barthel Index in order to decrease caregiver assistance needed and increase ability to perform ADLs and functional mobility.   Plan   Treatment Interventions ADL retraining;Functional transfer training;Endurance training;Patient/family training;Equipment evaluation/education;Activityengagement;Compensatory technique education   Goal Expiration Date 05/05/25   OT Frequency Other (comment)  (5x/week)   Discharge Recommendation   Rehab Resource Intensity Level, OT No post-acute rehabilitation needs   AM-PAC Daily Activity Inpatient   Lower Body Dressing 3   Bathing 3   Toileting 3   Upper Body Dressing 4   Grooming 4   Eating 4   Daily Activity Raw Score 21   Daily Activity Standardized Score (Calc for Raw Score >=11) 44.27   AM-PAC Applied Cognition Inpatient   Following a Speech/Presentation 4   Understanding Ordinary Conversation 4   Taking Medications 4   Remembering Where Things Are Placed or Put Away 4   Remembering List of 4-5 Errands 4   Taking Care of Complicated Tasks 4   Applied Cognition Raw Score 24   Applied Cognition Standardized Score 62.21   Barthel Index   Feeding 10   Bathing 0   Grooming Score 5   Dressing Score 5   Bladder Score 10   Bowels Score 10   Toilet Use Score 5   Transfers (Bed/Chair) Score 10   Mobility (Level Surface) Score 0   Stairs Score 5   Barthel Index Score 60   Additional Treatment Session   Start Time 1326   End Time 1336   Treatment Assessment S: 5/10 R knee pain O: Instructed pt on car transfers, verbalized  understanding.  Patient completed commode transfer with supervision.  Hygiene for urination independent.  Patient donned underwear and pants with supervision.  Functional mobility short distance with RW supervision.  Patient doffed/donned B socks with supervision seated and donned B tie shoes with supervisoin.  Patient donned bra and sweatshirt independently seated.  A: Tolerated well.  Cooperative and pleasant.  Motivated to return home.  Patient has good family support and is OK for discharge from an OT standpoint today.  P: no rehab needs   Licensure   [place] License Number  Stephanie Portillo MS OTR/L [id number]

## 2025-04-21 NOTE — ANESTHESIA POSTPROCEDURE EVALUATION
Post-Op Assessment Note    CV Status:  Stable  Pain Score: 0    Pain management: adequate       Mental Status:  Awake   Hydration Status:  Stable   PONV Controlled:  None   Airway Patency:  Patent  Two or more mitigation strategies used for obstructive sleep apnea   Post Op Vitals Reviewed: Yes    No anethesia notable event occurred.    Staff: CRNA   Comments: spontaneously breathing, protecting airway, vss,fully endorsed to recovery w/o AC          Last Filed PACU Vitals:  Vitals Value Taken Time   Temp 97.6 °F (36.4 °C) 04/21/25 0945   Pulse 96 04/21/25 0945   /59 04/21/25 0945   Resp 20 04/21/25 0945   SpO2 94 % 04/21/25 0945       Modified Kassandra:     Vitals Value Taken Time   Activity 2 04/21/25 0945   Respiration 2 04/21/25 0945   Circulation 2 04/21/25 0945   Consciousness 2 04/21/25 0945   Oxygen Saturation 2 04/21/25 0945     Modified Kassandra Score: 10

## 2025-04-21 NOTE — INTERVAL H&P NOTE
H&P reviewed. After examining the patient I find no changes in the patients condition since the H&P had been written.    Vitals:    04/21/25 0620   BP: 143/78   Pulse: (!) 108   Resp: 18   Temp: 97.8 °F (36.6 °C)   SpO2: 97%   Plan for right total knee arthroplasty today

## 2025-04-21 NOTE — OP NOTE
OPERATIVE REPORT  PATIENT NAME: Keesha Griffith  : 1965  MRN: [id number]  Pt Location:  [facility] OR ROOM 01    Surgery Date: 2025    Surgeons and Role:     * Kamlesh Richter, DO - Primary     * Abhishek Curry PA-C - Assisting, no qualified resident was available an assistant was needed for patient positioning, prepping and draping, soft tissue retraction, protection of vital structures throughout the case, exposure of the femur and tibia for robotic assisted resection and implantation of final prosthesis, superficial closure, and to complete the case safely.     * Darron Fuentes,  ATC/OTC - 2nd Assist    Preop Diagnosis:  Primary osteoarthritis of right knee [M17.11]  Chronic pain of right knee [M25.561, G89.29]    Post-Op Diagnosis Codes:     * Primary osteoarthritis of right knee [M17.11]     * Chronic pain of right knee [M25.561, G89.29]    Procedure(s):  Right - ARTHROPLASTY KNEE TOTAL W ROBOT- PRESS FIT    Specimens:  * No specimens in log *    Estimated Blood Loss:   30 mL    Drains: None    Anesthesia Type:   Spinal with sedation, postoperative adductor canal block with Exparel    Intravenous fluids: 1300 cc    Antibiotics: Ancef 2 g    Urine output: No Muniz    Tourniquet time: 49 minutes at 250 mmHg    Implant Name Type Inv. Item Serial No.  Lot No. LRB No. Used Action   CEMENT BN 40 GM PALACOS RADIOPAQUE W/O ANTIBIOTIC - [id number]  CEMENT BN 40 GM PALACOS RADIOPAQUE W/O ANTIBIOTIC  [employer] [id number] Right 1 Implanted   CEMENT BN 40 GM PALACOS RADIOPAQUE W/O ANTIBIOTIC - [id number]  CEMENT BN 40 GM PALACOS RADIOPAQUE W/O ANTIBIOTIC  [employer] [id number] Right 1 Implanted   INSERT TIB SZ 5 5MM RT MED STAB ATTUNE - [id number]  INSERT TIB SZ 5 5MM RT MED STAB ATTUNE  DEPUY [id number] Right 1 Implanted   COMPONENT PATELLA 35MM MEDIAL DOME ATTUNE - [id number]  COMPONENT PATELLA 35MM MEDIAL DOME ATTUNE  DEPUY [id number] Right 1 Implanted   COMPONENT FEM SZ 5 RT NRW  CR ATTUNE - [id number]   COMPONENT FEM SZ 5 RT NRW  CR ATTUNE  DEPUY [id number] Right 1 Implanted   BASEPLATE TIBIAL SZ 4 FX BRNG  ATTUNE - [id number]  BASEPLATE TIBIAL SZ 4 FX BRNG  ATTUNE  DEPUY [id number] Right 1 Implanted     Operative Indications:  Primary osteoarthritis of right knee [M17.11]  Chronic pain of right knee [M25.561, G89.29]  Patient is a very pleasant 60-year-old female known to me for treatment of her activity related right knee pain.  The patient has attempted multiple forms of conservative measure treatment and all have failed to provide long-lasting relief of her discomfort.  With failed conservative treatment, persistent activity related pain, and end-stage osteoarthritis of her right knee I recommended she undergo robotic assisted right total knee arthroplasty.  The risks and benefits of undergoing the procedure were discussed and consents were signed and placed into the chart.  The patient was optimized by her medical subspecialist in preparation for the procedure.  Ultimately the patient was deemed optimized and underwent robotic assisted right total knee arthroplasty on 4/21/2025.    Operative Findings:  Intraoperatively the patient's range of motion was from 18 degrees of flexion to 120 degrees of flexion with a 7 degree varus alignment of the right lower extremity.  There was grade 4 degenerative change in all 3 compartments of the knee.  Robotic assisted right total knee arthroplasty was successfully performed without complication.  The final construct had excellent range of motion from 0 to 135 degrees with excellent stability at 0 degrees 30 degrees and 90 degrees.  The patella tracked midline and flat.  The limb was restored to 2 degrees of varus alignment.  After closure of the arthrotomy range of motion, stability, patellar tracking were unchanged.  Patient tolerated procedure well.  There were no complications.    Complications:   None    Knee Approach: Medial Parapatellar    Chronic Narcotic Use:   No    Procedure and Technique:  The patient was identified and marked in the preoperative holding area. Final questions were addressed. Consents were visualized for correct laterality and the intended procedure. Patient was taken back to the operating room where they were transferred to the operating room table and administered spinal anesthesia by the anesthesia staff. Tourniquet was then applied to the right thigh. The right lower extremity was prepped and draped in the standard sterile fashion with the addition of the knee positioner.  The patient was administered 2 g of IV Ancef. Tranexamic acid was administered by the anesthesia staff.  A final timeout was performed and all members of the operating room staff were in agreement of the intended procedure and the correct laterality was confirmed.  An anterior knee incision was marked over the anterior right knee and carried over the medial portion of the patella down medial to the tibial tubercle.  The leg was exsanguinated and the tourniquet was inflated to 250 mmHg.  An incision was made over the anterior knee using a scalpel, and sharp dissection was carried deep through Stephanie's fascia creating full thickness flaps.  The extensor mechanism was identified.  A standard medial parapatellar arthrotomy was performed using a scalpel, and upon doing so benign-appearing yellow intra-articular fluid was expressed.  The anterior horn of the medial and lateral meniscus was removed. 50% of the patellar fat pad was removed for proper exposure. The distal arthrotomy was used to initiate a medial release around the proximal tibia at the joint line to the mid coronal plane.  On inspection there was diffuse grade 4 degenerative change in the medial compartment, lateral compartment, and patellofemoral compartment. Preparations were made for robotic assisted total knee arthroplasty.  The periarticular osteophytes were removed from around the distal femur, proximal tibia, and  intercondylar notch.  A partial synovectomy was performed.  The remnants of the ACL and the PCL were removed with electrocautery.  The visualized portions of the medial and lateral meniscus were removed.  The distal femur and proximal tibial arrays were placed without complication.  The checkpoints were created the distal femur proximal tibia.  The hip center was captured.  Anatomic registration was carried out in sequence.  Range of motion was evaluated and the knee was in 7 degrees of varus, and the range of motion was from  degrees.  The Proadjust graph was created.  Through manipulating the position of the femoral and tibial implants a well-balanced Proadjust graph was created and this was excepted as the intraoperative plan.  The robot was brought into the surgical field.  The retractors were placed around the distal femur proximal tibia.  Robotic assisted resection was performed of the distal femur in sequence without damage to the periarticular tissues.  The tibia was subluxed anteriorly and the proximal tibia was resected without complication.  All bony fragments were removed and resection appeared to be complete.  The knee was brought out into the full extension in the posterior compartment was evaluated.  The remnants of the medial and lateral meniscus were removed with electrocautery.  Based on the intraoperative plan a size 5 femoral notch guide was placed and the trochlea was prepared.  It was clear that the patient would benefit from a size 5 narrow femoral prosthesis.  The size 5 right CR trial was placed upon the distal femur and a size 5 polyethylene medial stabilized insert was placed into the articulation.  The knee was cycled through a range of motion and is range of motion was evaluated.  The overall alignment of the limb was restored to 2 degrees of varus, and range of motion was from 0-135 degrees.  This construct had excellent stability at 0 degrees, 30 degrees, 90 degrees.  This was  deemed to be the final construct.  The arrays were removed from the distal femur proximal tibia without complication.  The lug holes in the femur were drilled.  The trials were removed and the tibia was subluxed anteriorly.  The tibia was sized and was found that a size 4 base plate would be appropriate.  This was aligned with the medial 1/3 of the proximal tibial tubercle and pinned into place.  The tibia was then reamed, broached, and finished in sequence.  The base plate was removed.    Attention was then turned to the patella. The osteo synovial reflection was revealed using a Bovie. The patella height measured 21 millimeters prior to resection.  The cutting guide was set for the appropriate depth and the patella was evenly resected using oscillating saw. The patella was sized and found to be a 35 mm patellar button.  The 35 mm patellar button was then medialized over the patella and the lug holes were drilled. A trial patella button was placed upon the patella and the pre osteotomy patellar height was restored.  A lateral facetectomy of the patella was performed. The soft tissues of the posterior capsule were cauterized using Bovie to ensure hemostasis. The posterior capsule and medial and lateral periarticular soft tissues were injected with a periarticular analgesic cocktail.     The knee was again irrigated in preparation for implantation.  The tibia was subluxed and all retractors were in placed.  The final size 4 fixed-bearing press-fit tibial component was impacted upon the proximal tibia down to its final position where it had excellent bony apposition.  The final size 5 mm medial stabilized AOX CR polyethylene insert was locked into the tibial baseplate.  The femur was elevated and the final size 5 narrow right press-fit CR femoral component was impacted upon the distal femur down to its final position where it had excellent bony apposition.  1 bag of Palacos cement without gentamicin was mixed on the  back table and the final size 35 medialized patella button was cemented upon the patella.  As the cement cured the remainder of the periarticular pain cocktail was injected into the soft tissues around the knee.  Irrigation then followed with normal saline followed by Biasurge irrigation.  After the cement had cured the joint was inspected for any residual loose bodies of cement and these were removed. The tourniquet was released after 49 minutes at 250 mmHg.     The tracking and stability of the final components were assessed. The patella tracked midline without tilt, and the knee was stable in both flexion and extension, coronal stability was unchanged, and the knee demonstrated range of motion from 0-135°.  Hemostasis was achieved using electrocautery.       Closure began using a #1 barbed bidirectional suture for the arthrotomy.  After closure of the arthrotomy range of motion to gravity was tested and was found to be 0-135° of flexion. Quarter percent Marcaine plain was injected in the subcutaneous soft tissues.  The deep subcutaneous tissues were reapproximated with undyed 0 Vicryl, the superficial subcutaneous tissues were reapproximated with undyed 2-0 Vicryl, the skin edges reapproximated with a running 3-0 bidirectional barbed Monocryl suture, and the skin edges were sealed with Exofin.  After the Exofin had dried a silver impregnated Mepilex dressing was placed over the incision.  The drapes were removed and MARILYN stockings were placed on the bilateral lower extremities. Patient was then awakened from anesthesia without difficulty, and transferred to PACU in stable condition.      I was present for all critical portions of the procedure.    Patient Disposition:  PACU         SIGNATURE: Kamlesh Richter DO  DATE: April 21, 2025  TIME: 2:17 PM

## 2025-04-21 NOTE — PERIOPERATIVE NURSING NOTE
Patient received from PACU, alert and awake. Daughter at bed side. PO fluids offered. Vitals stable, no c/o pain a this time. Neurovascular assessment on right leg is intact and WDL. Dressing is clean, dry and intact . IV line is running.  Bed is locked and in lowest position. Side rails up. Call bell within reach. Plan of care in progress.

## 2025-04-21 NOTE — ANESTHESIA PROCEDURE NOTES
Peripheral Block    Patient location during procedure: holding area  Start time: 4/21/2025 10:06 AM  Reason for block: at surgeon's request and post-op pain management  Staffing  Performed by: Robbie Espinosa MD  Authorized by: Robbie Espinosa MD    Preanesthetic Checklist  Completed: patient identified, IV checked, site marked, risks and benefits discussed, surgical consent, monitors and equipment checked, pre-op evaluation and timeout performed  Peripheral Block  Prep: ChloraPrep  Patient monitoring: frequent blood pressure checks, continuous pulse oximetry and heart rate  Block type: Adductor Canal  Laterality: right  Injection technique: single-shot  Procedures: ultrasound guided, Ultrasound guidance required for the procedure to increase accuracy and safety of medication placement and decrease risk of complications.  Ultrasound permanent image saved  bupivacaine (PF) (MARCAINE) 0.5 % injection 20 mL - Perineural   10 mL - 4/21/2025 10:06:00 AM  bupivacaine liposomal (EXPAREL) 1.3 % injection 20 mL - Perineural   10 mL - 4/21/2025 10:06:00 AM  Needle  Needle type: Stimuplex   Needle gauge: 20 G  Needle length: 4 in  Needle localization: anatomical landmarks and ultrasound guidance  Needle insertion depth: 7 cm  Assessment  Injection assessment: incremental injection, frequent aspiration, injected with ease, negative aspiration, negative for heart rate change, no paresthesia on injection, no symptoms of intraneural/intravenous injection and needle tip visualized at all times  Paresthesia pain: none  Post-procedure:  site cleaned  patient tolerated the procedure well with no immediate complications

## 2025-04-21 NOTE — ANESTHESIA POSTPROCEDURE EVALUATION
Post-Op Assessment Note    CV Status:  Stable  Pain Score: 0    Pain management: adequate       Mental Status:  Alert and awake   Hydration Status:  Euvolemic   PONV Controlled:  Controlled   Airway Patency:  Patent     Post Op Vitals Reviewed: Yes    No anethesia notable event occurred.    Staff: Anesthesiologist           Last Filed PACU Vitals:  Vitals Value Taken Time   Temp 97.6 °F (36.4 °C) 04/21/25 0945   Pulse 97 04/21/25 0958   /61 04/21/25 0958   Resp 20 04/21/25 0958   SpO2 94 % 04/21/25 0958       Modified Kassandra:     Vitals Value Taken Time   Activity 2 04/21/25 0945   Respiration 2 04/21/25 0945   Circulation 2 04/21/25 0945   Consciousness 2 04/21/25 0945   Oxygen Saturation 2 04/21/25 0945     Modified Kassandra Score: 10

## 2025-04-21 NOTE — ANESTHESIA PROCEDURE NOTES
Spinal Block    Patient location during procedure: OR  Start time: 4/21/2025 7:35 AM  Reason for block: procedure for pain and at surgeon's request  Staffing  Performed by: Robbie Espinosa MD  Authorized by: Robbie Espinosa MD    Preanesthetic Checklist  Completed: patient identified, IV checked, site marked, risks and benefits discussed, surgical consent, monitors and equipment checked, pre-op evaluation and timeout performed  Spinal Block  Patient position: sitting  Prep: ChloraPrep and site prepped and draped  Patient monitoring: frequent blood pressure checks, continuous pulse ox and heart rate  Approach: midline  Location: L3-4  Needle  Needle type: Pencan   Needle gauge: 24 G  Needle length: 4 in  Assessment  Sensory level: T10  Injection Assessment:  negative aspiration for heme, no paresthesia on injection and positive aspiration for clear CSF.  Post-procedure:  site cleaned

## 2025-04-21 NOTE — ANESTHESIA PREPROCEDURE EVALUATION
Procedure:  ARTHROPLASTY KNEE TOTAL W ROBOT - RIGHT - PRESS FIT - SAME DAY (Right: Knee)    Relevant Problems   ANESTHESIA (within normal limits)      CARDIO   (+) Mixed hyperlipidemia      ENDO   (+) Acquired hypothyroidism      GI/HEPATIC   (+) Gastroesophageal reflux disease without esophagitis      MUSCULOSKELETAL   (+) Primary osteoarthritis of right knee      PULMONARY (within normal limits)        Physical Exam    Airway    Mallampati score: II  TM Distance: >3 FB  Neck ROM: full     Dental        Cardiovascular  Rhythm: regular, Rate: normal    Pulmonary   Breath sounds clear to auscultation    Other Findings  post-pubertal.      Anesthesia Plan  ASA Score- 3     Anesthesia Type- spinal with ASA Monitors.         Additional Monitors:     Airway Plan:            Plan Factors-Exercise tolerance (METS): >4 METS.    Chart reviewed. EKG reviewed.  Existing labs reviewed. Patient summary reviewed.    Patient is not a current smoker.              Induction-     Postoperative Plan- Plan for postoperative opioid use.         Informed Consent- Anesthetic plan and risks discussed with patient.  I personally reviewed this patient with the CRNA. Discussed and agreed on the Anesthesia Plan with the CRNA..      NPO Status:  Vitals Value Taken Time   Date of last liquid 04/20/25 04/21/25 0608   Time of last liquid 2300 04/21/25 0608   Date of last solid 04/20/25 04/21/25 0608   Time of last solid 1800 04/21/25 0608

## 2025-04-21 NOTE — PHYSICAL THERAPY NOTE
PT TREATMENT     04/21/25 1405   PT Last Visit   PT Visit Date 04/21/25   Note Type   Note Type Treatment   Pain Assessment   Pain Assessment Tool 0-10   Pain Score 3  (Right knee area)   Restrictions/Precautions   RLE Weight Bearing Per Order WBAT   Other Precautions Fall Risk;Pain  (POD #0)   General   Chart Reviewed Yes   Family/Caregiver Present Yes  (Daughter present)   Cognition   Overall Cognitive Status WFL   Arousal/Participation Cooperative   Subjective   Subjective patient states decreased pain prior to session   Bed Mobility   Additional Comments Patient sitting out of bed in bedside chair upon arrival by therapist   Transfers   Sit to Stand 5  Supervision   Stand to Sit 5  Supervision   Ambulation/Elevation   Gait Assistance 5  Supervision   Additional items Verbal cues  (Cueing for improved right knee extension with weightbearing)   Assistive Device Rolling walker   Distance 150 feet   Stair Management Assistance 4  Minimal assist   Additional items Assist x 1;Verbal cues;Tactile cues   Stair Management Technique One rail L;Step to pattern;With cane   Number of Stairs 4   Balance   Static Sitting Fair +   Dynamic Sitting Fair   Static Standing Fair   Dynamic Standing Fair -   Ambulatory Fair -   Activity Tolerance   Activity Tolerance Patient tolerated treatment well   Nurse Made Aware Yes   Exercises   Marching Standing;10 reps;Bilateral   Balance training  Sidestepping completed for balance and coordination   Assessment   Assessment Patient's status post right TKA now POD #0 and tolerating activity well O: Education completed for importance of quad setting with weightbearing on right lower extremity for improved knee extension.  Education completed in importance of knee extension at all times while sleeping.  Quad setting completed with 10 repetitions sit and stand A: Patient tolerating gait well on level and unlevel surfaces able to manipulate household distances and is ready for same-day  discharge.  The patient's AM-PAC Basic Mobility Inpatient Short Form Raw Score is 23. A Raw score of greater than 16 suggests the patient may benefit from discharge to home. Please also refer to the recommendation of the Physical Therapist for safe discharge planning.         Plan   Treatment/Interventions Functional transfer training;LE strengthening/ROM;Elevations;Therapeutic exercise;Endurance training;Patient/family training;Gait training;Compensatory technique education;Equipment eval/education   PT Frequency 3-5x/wk   Discharge Recommendation   Rehab Resource Intensity Level, PT III (Minimum Resource Intensity)   AM-PAC Basic Mobility Inpatient   Turning in Flat Bed Without Bedrails 4   Lying on Back to Sitting on Edge of Flat Bed Without Bedrails 4   Moving Bed to Chair 4   Standing Up From Chair Using Arms 4   Walk in Room 4   Climb 3-5 Stairs With Railing 3   Basic Mobility Inpatient Raw Score 23   Basic Mobility Standardized Score 50.88   [facility] Highest Level Of Mobility   JH-HLM Goal 7: Walk 25 feet or more   JH-HLM Achieved 7: Walk 25 feet or more   Education   Patient Demonstrates acceptance/verbal understanding;Explanation/teachback used;Demonstrates verbal understanding   Licensure   [place] License Number  Keesha Smith PT [id number]

## 2025-04-21 NOTE — PHYSICAL THERAPY NOTE
PHYSICAL THERAPY EVALUATION/TREATMENT     04/21/25 1258   PT Last Visit   PT Visit Date 04/21/25   Note Type   Note type Evaluation   Pain Assessment   Pain Assessment Tool 0-10   Pain Score 4  (Right knee area)   Restrictions/Precautions   RLE Weight Bearing Per Order WBAT   Other Precautions Fall Risk;Pain  (POD #0)   Home Living   Type of Home House   Home Layout Multi-level;Stairs to enter with rails  (One-step to enter, 8 steps to the first level and 10 steps to the second level.  Patient is set up to stay on the first level with a commode due to the bathroom being on the second floor)   Bathroom Equipment Shower chair;Commode   Home Equipment Walker;Cane   Additional Comments Patient independent without an assistive device prior to admission although using a cane at times   Prior Function   Level of [place] Independent with ADLs;Independent with functional mobility;Independent with IADLS   Lives With Family  (Lives with her mother)   Receives Help From Family   IADLs Independent with driving;Independent with meal prep;Independent with medication management   Falls in the last 6 months 0   Vocational Full time employment   Comments Works as a    General   Additional Pertinent History Chart reviewed, patient is seen POD #0 status post right TKA, scheduled for same-day discharge.   Family/Caregiver Present Yes  (Daughter present)   Cognition   Overall Cognitive Status WFL   Arousal/Participation Cooperative   Orientation Level Oriented X4   Following Commands Follows all commands and directions without difficulty   Subjective   Subjective Patient with 3-4/10 pain in her right knee   RLE Assessment   RLE Assessment   (Range of motion within functional limits, knee-10- 90 degrees, strength hip and ankle 3+/4 -, quad 3+/5 with in extension range of motion)   LLE Assessment   LLE Assessment   (Range of motion within functional limits, strength 4+/5)   Coordination   Movements are Fluid  and Coordinated 0   Coordination and Movement Description Decreased coordination with gait and higher-level balance activity also due to POD #0 with anesthetic effect   Bed Mobility   Supine to Sit 4  Minimal assistance   Additional items Assist x 1   Additional Comments Patient out of bed in bedside chair at end of session   Transfers   Sit to Stand 4  Minimal assistance   Additional items Assist x 1   Stand to Sit 4  Minimal assistance   Additional items Assist x 1   Ambulation/Elevation   Gait Assistance 4  Minimal assist   Additional items Assist x 1;Verbal cues;Tactile cues   Assistive Device Rolling walker   Distance 15 feet with several changes in direction.  Patient walking on flexed knee and verbal cueing completed for quad setting to improve knee extension   Balance   Static Sitting Fair +   Dynamic Sitting Fair   Static Standing Fair -   Dynamic Standing Fair -   Ambulatory Fair -   Activity Tolerance   Activity Tolerance Patient limited by fatigue;Patient limited by pain  (Limited by POD #0)   Nurse Made Aware Yes   Assessment   Prognosis Good   Problem List Decreased strength;Decreased range of motion;Decreased endurance;Impaired balance;Decreased mobility;Decreased coordination;Pain   Assessment Patient seen for Physical Therapy evaluation. Patient admitted with Primary osteoarthritis of right knee.  Comorbidities affecting patient's physical performance include: .  Personal factors affecting patient at time of initial evaluation include: lives in two story house, inability to navigate community distances, inability to navigate level surfaces without external assistance, inability to perform dynamic tasks in community, limited home support, inability to perform current job functions, inability to perform physical activity, and inability to perform IADLS . Prior to admission, patient was independent with functional mobility without assistive device, independent with IADLS, living in a multi-level home,  ambulating household distance, ambulating community distances, and works full time.  Please find objective findings from Physical Therapy assessment regarding body systems outlined above with impairments and limitations including weakness, decreased ROM, impaired balance, decreased endurance, impaired coordination, gait deviations, pain, decreased activity tolerance, decreased functional mobility tolerance, and fall risk.  The Barthel Index was used as a functional outcome tool presenting with a score of Barthel Index Score: 60 today indicating marked limitations of functional mobility and ADLS.  Patient's clinical presentation is currently unstable/unpredictable as seen in patient's presentation of vital sign response, changing level of pain, increased fall risk, new onset of impairment of functional mobility, decreased endurance, and new onset of weakness. Pt would benefit from continued Physical Therapy treatment to address deficits as defined above and maximize level of functional mobility. As demonstrated by objective findings, the assigned level of complexity for this evaluation is high.The patient's -[facility] Basic Mobility Inpatient Short Form Raw Score is 23. A Raw score of greater than 16 suggests the patient may benefit from discharge to home. Please also refer to the recommendation of the Physical Therapist for safe discharge planning.   Goals   Patient Goals To feel better, go home and get back to normal activity   STG Expiration Date 04/28/25   Short Term Goal #1 Transfers and gait independently with roller walker   Short Term Goal #2 Gait endurance to functional household distances, strength right quads 4 -/5   LTG Expiration Date 05/05/25   Long Term Goal #1 Strength right lower extremity grossly 4/5   Long Term Goal #2 Range of motion right knee 0 to 110 degrees, independent transfers and gait with least restrictive assistive device for functional community distances   Plan   Treatment/Interventions  Functional transfer training;LE strengthening/ROM;Elevations;Therapeutic exercise;Endurance training;Patient/family training;Equipment eval/education;Bed mobility;Gait training;Compensatory technique education   PT Frequency Twice a day   Discharge Recommendation   Rehab Resource Intensity Level, PT III (Minimum Resource Intensity)   AM-PAC Basic Mobility Inpatient   Turning in Flat Bed Without Bedrails 4   Lying on Back to Sitting on Edge of Flat Bed Without Bedrails 3   Moving Bed to Chair 3   Standing Up From Chair Using Arms 3   Walk in Room 3   Climb 3-5 Stairs With Railing 3   Basic Mobility Inpatient Raw Score 19   Basic Mobility Standardized Score 42.48   [facility] Highest Level Of Mobility   -HLM Goal 6: Walk 10 steps or more   -HLM Achieved 6: Walk 10 steps or more   Barthel Index   Feeding 10   Bathing 0   Grooming Score 0   Dressing Score 5   Bladder Score 5   Bowels Score 10   Toilet Use Score 5   Transfers (Bed/Chair) Score 10   Mobility (Level Surface) Score 0   Stairs Score 5   Barthel Index Score 50   Additional Treatment Session   Start Time 1240   End Time 1258   Treatment Assessment s patient states 4/10 pain in right knee at end of session:  O: Bilateral lower extremity exercise completed as listed below A: Patient will benefit from continued physical therapy with progression as tolerated and increasing functional mobility with clinical staff as well.  Patient is POD #0 and scheduled for same-day discharge will be able to accomplish this goal   Exercises   Heelslides Supine;5 reps;Bilateral   Hip Flexion Sitting;5 reps;Bilateral  (Alternating)   Knee AROM Long Arc Quad Sitting;5 reps;Bilateral  (Alternating)   Ankle Pumps Sitting;10 reps;Bilateral   Balance training  Sidestepping and backward stepping completed for balance and coordination   Licensure   [place] License Number  Keesha Smith, PT 4 [id number]

## 2025-04-22 ENCOUNTER — TELEPHONE (OUTPATIENT)
Dept: PHYSICAL THERAPY | Facility: CLINIC | Age: 60
End: 2025-04-22

## 2025-04-22 ENCOUNTER — TELEPHONE (OUTPATIENT)
Dept: OBGYN CLINIC | Facility: HOSPITAL | Age: 60
End: 2025-04-22

## 2025-04-22 NOTE — TELEPHONE ENCOUNTER
PT returned patient call about upper thigh weakness/ heaviness and tightness. Discussed weakness and heaviness of LE post op due to surgery and nerve block. Also discussed how to perform gentle standing hip flexor stretch with edu not to continue if this is painful. Patient verbalized understanding.     Catalina Faustin PT, DPT

## 2025-04-23 DIAGNOSIS — Z96.651 S/P TOTAL KNEE REPLACEMENT NOT USING CEMENT, RIGHT: Primary | ICD-10-CM

## 2025-04-23 DIAGNOSIS — R11.0 NAUSEA: ICD-10-CM

## 2025-04-23 RX ORDER — ONDANSETRON 4 MG/1
4 TABLET, ORALLY DISINTEGRATING ORAL EVERY 6 HOURS PRN
Qty: 20 TABLET | Refills: 0 | Status: SHIPPED | OUTPATIENT
Start: 2025-04-23

## 2025-04-23 NOTE — TELEPHONE ENCOUNTER
Called patient and notified at this time of the zofran order. She states that she did have a BM as well, which is helping. No further questions at this time.

## 2025-04-23 NOTE — TELEPHONE ENCOUNTER
"Patient contacted for a postoperative follow up assessment. Patient reports doing \"pretty good\". Patient states current pain level of a  2-3/10  when sitting and 3-4/10 when walking with RW. Patient denies increase in swelling and dressing is clean, dry and intact. Patient is icing the site regularly.     We reviewed patients AVS medication list. Patient is taking Tylenol 1000mg every 8 hours, Celebrex 200mg BID,  Oxycodone 5mg PRN, ASA 325mg BID, Colace 100mg BID. Patient has not yet had a BM but is passing gas.  Recommended increasing fluid and fiber intake- adding prune juice to regimen.     Patient reports nausea when eating, denies vomiting, abdominal pain, chest pain, shortness of breath, fever, dizziness and calf pain. Patient confirmed post-op appointment with PT 04/24 and surgeon on 05/07. Patient does not have any other questions or concerns at this time. Pt was encouraged to call with any questions, concerns or issues.    "

## 2025-04-24 ENCOUNTER — OFFICE VISIT (OUTPATIENT)
Dept: PHYSICAL THERAPY | Facility: CLINIC | Age: 60
End: 2025-04-24
Payer: COMMERCIAL

## 2025-04-24 DIAGNOSIS — M25.561 CHRONIC PAIN OF RIGHT KNEE: ICD-10-CM

## 2025-04-24 DIAGNOSIS — Z96.651 STATUS POST RIGHT KNEE REPLACEMENT: Primary | ICD-10-CM

## 2025-04-24 DIAGNOSIS — G89.29 CHRONIC PAIN OF RIGHT KNEE: ICD-10-CM

## 2025-04-24 DIAGNOSIS — M17.11 PRIMARY OSTEOARTHRITIS OF RIGHT KNEE: ICD-10-CM

## 2025-04-24 PROCEDURE — 97164 PT RE-EVAL EST PLAN CARE: CPT

## 2025-04-24 PROCEDURE — 97110 THERAPEUTIC EXERCISES: CPT

## 2025-04-24 NOTE — HOME EXERCISE EDUCATION
Program_ID:[id number]   Access Code: [email or web address]  URL: [email or web address]  Date: 04-  Prepared By: Catalina Faustin    Program Notes      Exercises      - Supine Quad Set - 1 x daily - 7 x weekly - 1 sets - 10 reps - 5 sec hold      - Supine Heel Slide with Strap - 1 x daily - 7 x weekly - 1 sets - 10 reps      - Seated Long Arc Quad - 1 x daily - 7 x weekly - 1 sets - 10 reps      - Long Sitting Calf Stretch with Strap - 1 x daily - 7 x weekly -  sets - 5 reps - 10 sec hold      - Standing Hip Flexor Stretch - 1 x daily - 7 x weekly -  sets - 5 reps - 10 sec hold

## 2025-04-24 NOTE — TELEPHONE ENCOUNTER
As a final attempt, a third outreach has been made via fax to facility. Please see Contacts section for details. This encounter will be closed and completed by end of day. Should we receive the requested information because of previous outreach attempts, the requested patient's chart will be updated appropriately.     Thank you  Lazara Davis MA

## 2025-04-24 NOTE — PROGRESS NOTES
PT post op Evaluation     Today's date: 2025  Patient name: Keesha Griffith  : 1965  MRN: [id number]  Referring provider: Kamlesh Richter DO  Dx:   Encounter Diagnosis     ICD-10-CM    1. Status post right knee replacement  Z96.651       2. Primary osteoarthritis of right knee  M17.11       3. Chronic pain of right knee  M25.561     G89.29                        Assessment  Impairments: abnormal gait, abnormal or restricted ROM, impaired balance, impaired physical strength, pain with function and weight-bearing intolerance    Assessment details: Keesha Griffith is a 60 y.o. female who presents for post op exam for R TKE performed on 2025. Patient presents with R knee pain, decreased RLE strength, decreased R knee ROM, and ambulatory dysfunction. Due to these impairments, patient has difficulty performing ADL's, recreational activities, work-related activities, ambulation, stair negotiation, transfers. Patient's clinical presentation is consistent with their referring diagnosis of  S/P R TKA, Primary osteoarthritis of right knee and Chronic pain of right knee. Patient has been educated in post-op contraindications / precautions, stair negotiation, curb negotiation, gait training w/ RW and SPC, home exercise program, and plan of care. Patient would benefit from skilled physical therapy services to address their aforementioned functional limitations and progress towards prior level of function and independence with home exercise program.         Goals  Knee   Short Term Goals:  Target Date 4 weeks   STG1. Initiate and advance HEP to maximize progress between therapy sessions  STG2. Pt will demo AROM  B/L knee to 0-120 degrees to allow pt to transfer in/out of the car w/o difficulty   STG3. Improve B/L LE strength to 4/5 to allow pt to raise from sit to stand w/o UE assist.  STG4. Reduce pain w/ WB activity to 0-4/10 to improve functional independence.     Long Term Goals:  Target Date 12 weeks   LTG1. Pt to be  Indep with HEP  to maximize progress between therapy sessions and improve functional mobility  LTG2. Pt will demo knee ROM of 0-130 or greater needed for reciprocal stairs w/ handrail w/o pain and to don shoes/socks independently.  LTG3. Pt to tolerate prolonged walking on uneven terrain w/o asst device.  LTG4. Pt to demo LE strength of 4+/5 or better such that pt can perform reciprocal stair negotiation  LTG5: Pt will improve standing tolerance to 30 min or more as per PLOF to return to work related tasks    Plan  Patient would benefit from: skilled physical therapy  Planned modality interventions: cryotherapy, thermotherapy: hydrocollator packs and unattended electrical stimulation    Planned therapy interventions: balance/weight bearing training, joint mobilization, neuromuscular re-education, patient education, self care, therapeutic activities, therapeutic exercise, functional ROM exercises, home exercise program, IASTM, manual therapy, abdominal trunk stabilization, patient/caregiver education and postural training    Frequency: 2-3x week  Plan of Care beginning date: 4/10/2025  Plan of Care expiration date: 7/17/2025  Treatment plan discussed with: patient  Plan details: HEP development, stretching, strengthening, A/AA/PROM, joint mobilizations, posture education, STM/MI as needed to reduce muscle tension, muscle reeducation, PLOC discussed and agreed upon with patient.       Subjective Evaluation    History of Present Illness  Mechanism of injury: Patient reports to PT for post op TKA evaluation performed on 4/21/2025. Patient reports the pain meds have been bothering her stomach. Patient reports she did ok getting down the stairs today to come here. Patient reports she has been staying on the first floor with no issues so far. Patient has been using RW in home and in community. Patient reports the tightness in her thigh is getting better. Has some difficulty with lifting LE, edu this is normal at this stage  "post op.   Patient Goals  Patient goals for therapy: decreased edema, decreased pain, improved balance, increased motion, independence with ADLs/IADLs, increased strength, return to sport/leisure activities and return to work    Pain  Current pain ratin  At best pain ratin  At worst pain ratin  Location: R knee  Quality: dull ache and sharp  Aggravating factors: walking, stair climbing and standing    Social Support  Steps to enter house: yes  12  Stairs in house: yes   12  Lives in: multiple-level home  Lives with: parents (daughter will be around often to help)    Employment status: working ()    Diagnostic Tests  Abnormal x-ray: Severe bilateral knee osteoarthritis predominantly in the medial compartments..      Objective        Knee AROM: deg      R  L  R  Date:    4/10  4/10  4/24  Flexion: (sup)    120  125  90  Extension: (sup)  -10  0  -10      Strength: MMT  Hip    R  L  R   Date:     4/10  4/10  4/24  Flexion(sup)   5/5  5/5  3-/5  Abduction(S/L)  5/5  5/5  NT    Knee    R  L  R   Date:     4/10  4/10  4/24  Extension(Seated)  4/5  5/5  3/5  Flexion(Seated)  4/5  5/5  4-/5    Ankle    R  L  R  Date:     4/10  4/10  4/24  Dorsiflexion(seated)  5/5  5/5  5/5  Plantarflexion(seated)  5/5  5/5  5/5        Tenderness/Palpation: mild TTP near bandage       Function  2025:   Gait: mild antalgic gait pattern with use of RW   Transfers: able to perform supine to/from sit and car transfers with no assistance   Stairs: patient demo good form with \"up with the good and down with the bad\" with stair negotiation w/ SPC and rail   Curb: patient demo good understanding of \"up with the good and down with the bad\" with stair negotiation w/ RW     4/10/2025  Gait: mild antalgic gait pattern with with limited TKE in terminal stance and decreased stance time on RLE   Transfers: able to perform supine to/from sit and car transfers with no assistance   Stairs: patient demo good understanding of " "\"up with the good and down with the bad\" with stair negotiation w/ RW And SPC   Curb: patient demo good understanding of \"up with the good and down with the bad\" with stair negotiation w/ RW And SPC     TUG:   Post op 4/24/2025: 34.06 sec w/ RW and B/L UE push off          Precautions:   Past Medical History:   Diagnosis Date    Disease of thyroid gland     GERD (gastroesophageal reflux disease)     Hyperlipidemia      Past Surgical History:   Procedure Laterality Date    BREAST SURGERY Left     marker    CHOLECYSTECTOMY      lap    [facility] ARTHRP KNE CONDYLE&PLATU MEDIAL&LAT COMPARTMENTS Right 4/21/2025    Procedure: ARTHROPLASTY KNEE TOTAL W ROBOT - RIGHT - PRESS FIT - SAME DAY;  Surgeon: Kamlesh Richter DO;  Location: [facility] OR;  Service: Orthopedics    WISDOM TOOTH EXTRACTION         DOS: scheduled for 4/21/25  SOC: 4/10/2025  FOTO: TBA  POC Expiration: 7/17/2025  Daily Treatment Log:  Date 4/10/2025 4/24/2025      Visit # 1 2      Auth         Auth exp        Manual                        There Exer  15'      Bike for ROM         Quad set         Heel slides   5\" x10       Glute set         Supine gastroc stretch w/ sos   10\" x5       LAQ   1x5 limited ROM                               Pt edu See assessment section        HEP Discussed pre-made pre-op HEP   Updated and discussed       There Activ        STS        FSU         LSU                                 NMReed        Hooklying clamshell         Weight shifts                                 Modalities                                HEP:   Access Code: [email or web address]  URL: [email or web address]  Date: 04/24/2025  Prepared by: Catalina Faustin    Exercises  - Supine Quad Set  - 1 x daily - 7 x weekly - 1 sets - 10 reps - 5 sec hold  - Supine Heel Slide with Strap  - 1 x daily - 7 x weekly - 1 sets - 10 reps  - Seated Long Arc Quad  - 1 x daily - 7 x weekly - 1 sets - 10 reps  - Long Sitting Calf Stretch with Strap  - 1 x daily - 7 x weekly - 5 reps - 10 " sec hold  - Standing Hip Flexor Stretch  - 1 x daily - 7 x weekly - 5 reps - 10 sec hold

## 2025-04-25 ENCOUNTER — TELEPHONE (OUTPATIENT)
Age: 60
End: 2025-04-25

## 2025-04-25 DIAGNOSIS — R11.0 NAUSEA: Primary | ICD-10-CM

## 2025-04-25 RX ORDER — ONDANSETRON 4 MG/1
4 TABLET, FILM COATED ORAL EVERY 8 HOURS PRN
Qty: 20 TABLET | Refills: 0 | Status: SHIPPED | OUTPATIENT
Start: 2025-04-25

## 2025-04-25 NOTE — TELEPHONE ENCOUNTER
"Called and spoke with patient.     Patient reports \"face gets flushed\" and feels weak/gittery, fatigued, has appetite- but can't seem to eat. She states she \"gags\" when tries to eat. Patient states she does not feel febrile. I recommended for patient to check her temperature and report if >101. We discussed a bland diet- toast, jello- avoiding roughage, spicy foods. We discussed electrolyte drinks and staying hydrated. She denies cp, sob, or further symptoms at this time. Patient states that she thought maybe she's having a reaction/sensitivity to oxycodone. She states to have last taken that medication 8 hours ago.     I will forward to the surgeon for review and call patient with further recommendations.   "

## 2025-04-25 NOTE — TELEPHONE ENCOUNTER
Caller: Self    Doctor: Dr. Richter    Reason for call: Patient still reporting headache and nausea. States the zofran is not helping. Please advise    Call back#: [phone]

## 2025-04-28 ENCOUNTER — OFFICE VISIT (OUTPATIENT)
Dept: PHYSICAL THERAPY | Facility: CLINIC | Age: 60
End: 2025-04-28
Payer: COMMERCIAL

## 2025-04-28 DIAGNOSIS — G89.29 CHRONIC PAIN OF RIGHT KNEE: ICD-10-CM

## 2025-04-28 DIAGNOSIS — Z96.651 STATUS POST RIGHT KNEE REPLACEMENT: Primary | ICD-10-CM

## 2025-04-28 DIAGNOSIS — M17.11 PRIMARY OSTEOARTHRITIS OF RIGHT KNEE: ICD-10-CM

## 2025-04-28 DIAGNOSIS — M25.561 CHRONIC PAIN OF RIGHT KNEE: ICD-10-CM

## 2025-04-28 PROCEDURE — 97110 THERAPEUTIC EXERCISES: CPT

## 2025-04-28 NOTE — HOME EXERCISE EDUCATION
Program_ID:[id number]   Access Code: [email or web address]  URL: [email or web address]  Date: 04-  Prepared By: Catalina Faustin    Program Notes      Exercises      - Standing Hip Flexor Stretch - 1 x daily - 7 x weekly -  sets - 5 reps - 10 sec hold      - Modified Thompson Stretch - 1 x daily - 7 x weekly -  sets - 3 reps - 10 sec  hold      - Supine Quad Set - 1 x daily - 7 x weekly - 1 sets - 10 reps - 5 sec hold      - Supine Heel Slide with Strap - 1 x daily - 7 x weekly - 1 sets - 10 reps      - Long Sitting Calf Stretch with Strap - 1 x daily - 7 x weekly -  sets - 5 reps - 10 sec hold      - Supine Gluteal Sets - 1 x daily - 7 x weekly - 1 sets - 10 reps - 3 sec hold      - Seated Long Arc Quad - 1 x daily - 7 x weekly - 1 sets - 10 reps

## 2025-04-28 NOTE — HOME EXERCISE EDUCATION
Program_ID:[id number]   Access Code: [email or web address]  URL: [email or web address]  Date: 04-  Prepared By: Catalina Faustin    Program Notes      Exercises      - Standing Hip Flexor Stretch - 1 x daily - 7 x weekly -  sets - 5 reps - 10 sec hold      - Modified Thompson Stretch - 1 x daily - 7 x weekly -  sets - 3 reps - 10 sec  hold      - Supine Quad Set - 1 x daily - 7 x weekly - 1 sets - 10 reps - 5 sec hold      - Supine Heel Slide with Strap - 1 x daily - 7 x weekly - 1 sets - 10 reps      - Long Sitting Calf Stretch with Strap - 1 x daily - 7 x weekly -  sets - 5 reps - 10 sec hold      - Supine Gluteal Sets - 1 x daily - 7 x weekly - 1 sets - 10 reps - 3 sec hold      - Seated Long Arc Quad - 1 x daily - 7 x weekly - 1 sets - 10 reps

## 2025-04-28 NOTE — PROGRESS NOTES
Daily Note     Today's date: 2025  Patient name: Keesha Griffith  : 1965  MRN: [id number]  Referring provider: Kamlesh Richter DO  Dx:   Encounter Diagnosis     ICD-10-CM    1. Status post right knee replacement  Z96.651       2. Primary osteoarthritis of right knee  M17.11       3. Chronic pain of right knee  M25.561     G89.29                      Subjective: Patient reports she was having some issue with medication over the weekend and not feeling well. Patient took pain med today for PT but generally is not taking it anymore to avoid nausea.  Pain level in knee is about 7/10      Objective: See treatment diary below      Assessment: Tolerated treatment well with some limitations due to pain. Bandage removed today with no signs of infection noted. Discussed options for sleeping including side lying on L with a pillow between her knees. Patient demo Patient would benefit from continued PT      Plan: Continue per plan of care.      Precautions:   Past Medical History:   Diagnosis Date    Disease of thyroid gland     GERD (gastroesophageal reflux disease)     Hyperlipidemia      Past Surgical History:   Procedure Laterality Date    BREAST SURGERY Left     marker    CHOLECYSTECTOMY      lap    [place] ARTHRP KNE CONDYLE&PLATU MEDIAL&LAT COMPARTMENTS Right 2025    Procedure: ARTHROPLASTY KNEE TOTAL W ROBOT - RIGHT - PRESS FIT - SAME DAY;  Surgeon: Kamlesh Richter DO;  Location: [facility];  Service: Orthopedics    WISDOM TOOTH EXTRACTION         DOS: scheduled for 25  SOC: 4/10/2025  FOTO: TBA  POC Expiration: 2025  Daily Treatment Log:  Date 4/10/2025 2025 2025     Visit # 1 2 3     Auth         Auth exp        Manual   5'     Knee ROM    Extension:     Flexion:      Skin inspection    Bandaged removed with no signs of infection, healing well with glue and scabbing present. Instructed to avoid showering still and to wash hands if touching incision.      There Exer  15' 40'     Bike for ROM   "       Quad set    3\" x10      Heel slides   5\" x10  5\" x10      Glute set    3\" 1x10      Supine gastroc stretch w/ sos   10\" x5  10\" x5      LAQ   1x5 limited ROM       Supine hip flexor stretch    10\" x3 w/ PT assist                      Pt edu See assessment section   Edu on side sleeping and rotating through bending and straightening knee throughout the day.      HEP Discussed pre-made pre-op HEP   Updated and discussed  Updated and discussed      There Activ        STS        FSU         LSU                                 NMReed        Hooklying clamshell         Weight shifts    1x10 ML                              Modalities                                HEP:   Access Code: [email or web address]  URL: [email or web address]  Date: 04/24/2025  Prepared by: Catalina Faustin    Exercises  - Supine Quad Set  - 1 x daily - 7 x weekly - 1 sets - 10 reps - 5 sec hold  - Supine Heel Slide with Strap  - 1 x daily - 7 x weekly - 1 sets - 10 reps  - Seated Long Arc Quad  - 1 x daily - 7 x weekly - 1 sets - 10 reps  - Long Sitting Calf Stretch with Strap  - 1 x daily - 7 x weekly - 5 reps - 10 sec hold  - Standing Hip Flexor Stretch  - 1 x daily - 7 x weekly - 5 reps - 10 sec hold       "

## 2025-04-30 ENCOUNTER — OFFICE VISIT (OUTPATIENT)
Dept: PHYSICAL THERAPY | Facility: CLINIC | Age: 60
End: 2025-04-30
Payer: COMMERCIAL

## 2025-04-30 DIAGNOSIS — G89.29 CHRONIC PAIN OF RIGHT KNEE: ICD-10-CM

## 2025-04-30 DIAGNOSIS — Z96.651 STATUS POST RIGHT KNEE REPLACEMENT: Primary | ICD-10-CM

## 2025-04-30 DIAGNOSIS — M17.11 PRIMARY OSTEOARTHRITIS OF RIGHT KNEE: ICD-10-CM

## 2025-04-30 DIAGNOSIS — M25.561 CHRONIC PAIN OF RIGHT KNEE: ICD-10-CM

## 2025-04-30 PROCEDURE — 97110 THERAPEUTIC EXERCISES: CPT

## 2025-04-30 NOTE — PROGRESS NOTES
Daily Note     Today's date: 2025  Patient name: Keesha Griffith  : 1965  MRN: [id number]  Referring provider: Kamlesh Richter DO  Dx:   Encounter Diagnosis     ICD-10-CM    1. Status post right knee replacement  Z96.651       2. Primary osteoarthritis of right knee  M17.11       3. Chronic pain of right knee  M25.561     G89.29                      Subjective: Patient reports she had pain with getting out of the tub. Reports the pain was sharp but she does not note any instability or other issue following. Patient reports the stiffness is bothering her at night. She plans to trial sleeping in her bed this weekend. She has tried lying on the couch but it is hard to get her leg up.       Objective: See treatment diary below      Assessment: Tolerated treatment well with improving ROM noted into Ext and flexion. Currently patient is more limited in Ext vs flexion, however overall has good motion for this stage post op. Patient has been using SPC and PT discussed it is ok to alternate SPC and RW as needed based on soreness or stiffness. Patient would benefit from continued PT      Plan: Continue per plan of care.      Precautions:   Past Medical History:   Diagnosis Date    Disease of thyroid gland     GERD (gastroesophageal reflux disease)     Hyperlipidemia      Past Surgical History:   Procedure Laterality Date    BREAST SURGERY Left     marker    CHOLECYSTECTOMY      lap    [place] ARTHRP KNE CONDYLE&PLATU MEDIAL&LAT COMPARTMENTS Right 2025    Procedure: ARTHROPLASTY KNEE TOTAL W ROBOT - RIGHT - PRESS FIT - SAME DAY;  Surgeon: Kamlesh Richter DO;  Location: [facility];  Service: Orthopedics    WISDOM TOOTH EXTRACTION         DOS: scheduled for 25  SOC: 4/10/2025  FOTO: TBA  POC Expiration: 2025  Daily Treatment Log:  Date 4/10/2025 2025 2025 2025    Visit # 1 2 3 4    Auth         Auth exp        Manual   5' 5'    Knee ROM     Extension: -5 w/ QS    Flexion: 105    Skin inspection     "Bandaged removed with no signs of infection, healing well with glue and scabbing present. Instructed to avoid showering still and to wash hands if touching incision.  Incision remains well healing with no signs of infection.     There Exer  15' 40' 40'    Recumb Bike for ROM     Half moons 3'     Quad set    3\" x10  3\" x10     Heel slides   5\" x10  5\" x10  5\" 2x5     Glute set    3\" 1x10  3\" x10     Supine gastroc stretch w/ sos   10\" x5  10\" x5  10\" x5    LAQ   1x5 limited ROM   1x5 limited ROM     Supine hip flexor stretch    10\" x3 w/ PT assist      Seated knee flexion w/ opp OP     5\" x10     Seated HS stretch     5\" x5     Pt edu See assessment section   Edu on side sleeping and rotating through bending and straightening knee throughout the day.      HEP Discussed pre-made pre-op HEP   Updated and discussed  Updated and discussed  Updated and discussed     There Activ        STS        FSU         LSU                                 NMReed        Hooklying clamshell         Weight shifts    1x10 ML  1x10 M/L     HR/TR                         Modalities                                HEP:   Access Code: [email or web address]  URL: [email or web address]  Date: 04/24/2025  Prepared by: Catalina Faustin    Exercises  - Supine Quad Set  - 1 x daily - 7 x weekly - 1 sets - 10 reps - 5 sec hold  - Supine Heel Slide with Strap  - 1 x daily - 7 x weekly - 1 sets - 10 reps  - Seated Long Arc Quad  - 1 x daily - 7 x weekly - 1 sets - 10 reps  - Long Sitting Calf Stretch with Strap  - 1 x daily - 7 x weekly - 5 reps - 10 sec hold  - Standing Hip Flexor Stretch  - 1 x daily - 7 x weekly - 5 reps - 10 sec hold       "

## 2025-04-30 NOTE — HOME EXERCISE EDUCATION
Program_ID:[id number]   Access Code: [email or web address]  URL: [email or web address]  Date: 04-  Prepared By: Catalina Faustin    Program Notes      Exercises      - Standing Hip Flexor Stretch - 1 x daily - 7 x weekly -  sets - 5 reps - 10 sec hold      - Supine Quad Set - 1 x daily - 7 x weekly - 1 sets - 10 reps - 5 sec hold      - Supine Heel Slide with Strap - 1 x daily - 7 x weekly - 1 sets - 10 reps      - Long Sitting Calf Stretch with Strap - 1 x daily - 7 x weekly -  sets - 5 reps - 10 sec hold      - Supine Gluteal Sets - 1 x daily - 7 x weekly - 1 sets - 10 reps - 3 sec hold      - Standing Terminal Knee Extension at Wall with Ball - 1 x daily - 7 x weekly - 3 sets - 10 reps      - Seated Knee Flexion AAROM - 1 x daily - 7 x weekly - 1 sets - 10 reps - 5 sec hold

## 2025-05-01 NOTE — TELEPHONE ENCOUNTER
Spoke to patient regarding release form and she will be stopping by tomorrow to complete and sign and then I will refax.

## 2025-05-01 NOTE — TELEPHONE ENCOUNTER
Upon review of the In Basket request we  [facility] needs a HIPAA form done before they will release any records. See in the media tab tk u!  Patient is aware and will call office to update PCP.    Any additional questions or concerns should be emailed to the Practice Liaisons via the appropriate education email address, please do not reply via In Basket.    Thank you  Lazara Davis MA   [facility]

## 2025-05-02 ENCOUNTER — OFFICE VISIT (OUTPATIENT)
Dept: PHYSICAL THERAPY | Facility: CLINIC | Age: 60
End: 2025-05-02
Payer: COMMERCIAL

## 2025-05-02 DIAGNOSIS — Z96.651 STATUS POST RIGHT KNEE REPLACEMENT: Primary | ICD-10-CM

## 2025-05-02 DIAGNOSIS — G89.29 CHRONIC PAIN OF RIGHT KNEE: ICD-10-CM

## 2025-05-02 DIAGNOSIS — Z01.818 PRE-OP EXAM: ICD-10-CM

## 2025-05-02 DIAGNOSIS — M25.561 CHRONIC PAIN OF RIGHT KNEE: ICD-10-CM

## 2025-05-02 DIAGNOSIS — M17.11 PRIMARY OSTEOARTHRITIS OF RIGHT KNEE: ICD-10-CM

## 2025-05-02 PROCEDURE — 97140 MANUAL THERAPY 1/> REGIONS: CPT

## 2025-05-02 PROCEDURE — 97110 THERAPEUTIC EXERCISES: CPT

## 2025-05-02 NOTE — PROGRESS NOTES
Daily Note     Today's date: 2025  Patient name: Keesha Griffith  : 1965  MRN: [id number]  Referring provider: Kamlesh Richter DO  Dx:   Encounter Diagnosis     ICD-10-CM    1. Status post right knee replacement  Z96.651       2. Chronic pain of right knee  M25.561     G89.29       3. Primary osteoarthritis of right knee  M17.11       4. Pre-op exam  Z01.818             Start Time: 1100  Stop Time: 1145  Total time in clinic (min): 45 minutes    Subjective: Pt reports knee remains stiff, however reports she is able to get in/out of the car easier today.       Objective: See treatment diary below      Assessment: Pt demonstrates significant trunk flexion with ambulation, discussed posture with ambulating and reviewed heel strike to promote TKE with ambulation. Pt verbalized understanding, will follow up with surgeon Monday.       Plan: Continue per plan of care.      Precautions:   Past Medical History:   Diagnosis Date    Disease of thyroid gland     GERD (gastroesophageal reflux disease)     Hyperlipidemia      Past Surgical History:   Procedure Laterality Date    BREAST SURGERY Left     marker    CHOLECYSTECTOMY      lap    [place] ARTHRP KNE CONDYLE&PLATU MEDIAL&LAT COMPARTMENTS Right 2025    Procedure: ARTHROPLASTY KNEE TOTAL W ROBOT - RIGHT - PRESS FIT - SAME DAY;  Surgeon: Kamlesh Richter DO;  Location: [facility];  Service: Orthopedics    WISDOM TOOTH EXTRACTION         DOS: scheduled for 25  SOC: 4/10/2025  FOTO: TBA  POC Expiration: 2025  Daily Treatment Log:  Date 4/10/2025 2025 2025 2025 2025   Visit # 1 2 3 4 5   Auth         Auth exp        Manual   5' 5' 15'   Knee ROM     Extension: -5 w/ QS    Flexion: 105 Extension -8, Flexion 90 degrees start of session   Skin inspection    Bandaged removed with no signs of infection, healing well with glue and scabbing present. Instructed to avoid showering still and to wash hands if touching incision.  Incision remains well  "healing with no signs of infection.     PROM     Knee extension   Mobs     PF superior   There Exer  15' 40' 40' 25'   Recumb Bike for ROM     Half moons 3'  5' min half moons   Quad set    3\" x10  3\" x10  5\"x20   Heel slides   5\" x10  5\" x10  5\" 2x5  w/ ball SOS 10\"x10   Glute set    3\" 1x10  3\" x10  3\"x10   Supine gastroc stretch w/ sos   10\" x5  10\" x5  10\" x5    LAQ   1x5 limited ROM   1x5 limited ROM  1x10    Supine hip flexor stretch    10\" x3 w/ PT assist      Seated knee flexion w/ opp OP     5\" x10  5\" x10    Seated HS stretch     5\" x5     Pt edu See assessment section   Edu on side sleeping and rotating through bending and straightening knee throughout the day.      HEP Discussed pre-made pre-op HEP   Updated and discussed  Updated and discussed  Updated and discussed     There Activ        STS        FSU         LSU                                 NMReed        Hooklying clamshell         Weight shifts    1x10 ML  1x10 M/L     HR/TR                         Modalities                                HEP:   Access Code: [email or web address]  URL: [email or web address]  Date: 04/24/2025  Prepared by: Catalina Faustin    Exercises  - Supine Quad Set  - 1 x daily - 7 x weekly - 1 sets - 10 reps - 5 sec hold  - Supine Heel Slide with Strap  - 1 x daily - 7 x weekly - 1 sets - 10 reps  - Seated Long Arc Quad  - 1 x daily - 7 x weekly - 1 sets - 10 reps  - Long Sitting Calf Stretch with Strap  - 1 x daily - 7 x weekly - 5 reps - 10 sec hold  - Standing Hip Flexor Stretch  - 1 x daily - 7 x weekly - 5 reps - 10 sec hold       "

## 2025-05-05 ENCOUNTER — OFFICE VISIT (OUTPATIENT)
Dept: PHYSICAL THERAPY | Facility: CLINIC | Age: 60
End: 2025-05-05
Payer: COMMERCIAL

## 2025-05-05 DIAGNOSIS — Z96.651 STATUS POST RIGHT KNEE REPLACEMENT: Primary | ICD-10-CM

## 2025-05-05 DIAGNOSIS — G89.29 CHRONIC PAIN OF RIGHT KNEE: ICD-10-CM

## 2025-05-05 DIAGNOSIS — M17.11 PRIMARY OSTEOARTHRITIS OF RIGHT KNEE: ICD-10-CM

## 2025-05-05 DIAGNOSIS — M25.561 CHRONIC PAIN OF RIGHT KNEE: ICD-10-CM

## 2025-05-05 PROCEDURE — 97110 THERAPEUTIC EXERCISES: CPT

## 2025-05-05 NOTE — PROGRESS NOTES
Daily Note     Today's date: 2025  Patient name: Keesha Griffith  : 1965  MRN: [id number]  Referring provider: Kamlesh Richter DO  Dx:   Encounter Diagnosis     ICD-10-CM    1. Status post right knee replacement  Z96.651       2. Chronic pain of right knee  M25.561     G89.29       3. Primary osteoarthritis of right knee  M17.11                        Subjective: Patient reports she feels the most stiff in the mornings. Inquired about walking more. Discussed inside with the cane is ok and to base amount on her tolerance but her balance is still too impaired to be taking walks outside at this time.       Objective: See treatment diary below      Assessment: Tolerated treatment well. Noted improvement in resting knee ext. ROM is currently at expected level but will continue to progress as tolerated to further improve. Patient would benefit from continued PT to further improved ROM, functional mobility, LE strength, and balance.       Plan: Continue per plan of care.      Precautions:   Past Medical History:   Diagnosis Date    Disease of thyroid gland     GERD (gastroesophageal reflux disease)     Hyperlipidemia      Past Surgical History:   Procedure Laterality Date    BREAST SURGERY Left     marker    CHOLECYSTECTOMY      lap    [place] ARTHRP KNE CONDYLE&PLATU MEDIAL&LAT COMPARTMENTS Right 2025    Procedure: ARTHROPLASTY KNEE TOTAL W ROBOT - RIGHT - PRESS FIT - SAME DAY;  Surgeon: Kamlesh Richter DO;  Location: [facility];  Service: Orthopedics    WISDOM TOOTH EXTRACTION         DOS: scheduled for 25  SOC: 4/10/2025  FOTO: 2025  POC Expiration: 2025  Daily Treatment Log:  Date 2025   Visit # 6 FOTO    4 5   Auth         Auth exp        Manual    5' 15'   Knee ROM  Extension: -6 at rest    Flexion: 100 AAROM   Extension: -5 w/ QS    Flexion: 105 Extension -8, Flexion 90 degrees start of session   Skin inspection  Noted some peeling glue but otherwise no issues and  "incision appears to be healing well.    Incision remains well healing with no signs of infection.     PROM     Knee extension   Mobs     PF superior   There Exer 35'   40' 25'   Recumb Bike for ROM  5' min half moons   Half moons 3'  5' min half moons   Quad set  5\" 1x10 ; 2x   3\" x10  5\"x20   Heel slides  5\" x10    5\" 2x5  w/ ball SOS 10\"x10   Glute set  5\" x10    3\" x10  3\"x10   Supine gastroc stretch w/ sos  10\" x5    10\" x5    LAQ  2x5   1x5 limited ROM  1x10    Supine hip flexor stretch         Seated knee flexion w/ opp OP  5\" x10    5\" x10  5\" x10    Seated HS stretch  5\" x5    5\" x5     Pt edu        HEP  Update this session   Updated and discussed     There Activ        STS        FSU         LSU                                 NMReed 5'       Hooklying clamshell         Weight shifts  3\" 1x10 ML    1x10 M/L     HR/TR  1x10 HR                        Modalities                                HEP:   Access Code: [email or web address]  URL: [email or web address]  Date: 04/24/2025  Prepared by: Catalina Faustin    Exercises  - Supine Quad Set  - 1 x daily - 7 x weekly - 1 sets - 10 reps - 5 sec hold  - Supine Heel Slide with Strap  - 1 x daily - 7 x weekly - 1 sets - 10 reps  - Seated Long Arc Quad  - 1 x daily - 7 x weekly - 1 sets - 10 reps  - Long Sitting Calf Stretch with Strap  - 1 x daily - 7 x weekly - 5 reps - 10 sec hold  - Standing Hip Flexor Stretch  - 1 x daily - 7 x weekly - 5 reps - 10 sec hold       "

## 2025-05-07 ENCOUNTER — APPOINTMENT (OUTPATIENT)
Dept: RADIOLOGY | Facility: CLINIC | Age: 60
End: 2025-05-07
Attending: ORTHOPAEDIC SURGERY
Payer: COMMERCIAL

## 2025-05-07 ENCOUNTER — OFFICE VISIT (OUTPATIENT)
Dept: OBGYN CLINIC | Facility: CLINIC | Age: 60
End: 2025-05-07

## 2025-05-07 ENCOUNTER — OFFICE VISIT (OUTPATIENT)
Dept: PHYSICAL THERAPY | Facility: CLINIC | Age: 60
End: 2025-05-07
Payer: COMMERCIAL

## 2025-05-07 ENCOUNTER — TELEPHONE (OUTPATIENT)
Dept: FAMILY MEDICINE CLINIC | Facility: CLINIC | Age: 60
End: 2025-05-07

## 2025-05-07 VITALS — BODY MASS INDEX: 32.96 KG/M2 | HEIGHT: 63 IN | WEIGHT: 186 LBS

## 2025-05-07 DIAGNOSIS — G89.29 CHRONIC PAIN OF RIGHT KNEE: ICD-10-CM

## 2025-05-07 DIAGNOSIS — Z96.651 S/P TOTAL KNEE REPLACEMENT NOT USING CEMENT, RIGHT: Primary | ICD-10-CM

## 2025-05-07 DIAGNOSIS — Z96.651 STATUS POST RIGHT KNEE REPLACEMENT: Primary | ICD-10-CM

## 2025-05-07 DIAGNOSIS — M17.11 PRIMARY OSTEOARTHRITIS OF RIGHT KNEE: ICD-10-CM

## 2025-05-07 DIAGNOSIS — M25.561 CHRONIC PAIN OF RIGHT KNEE: ICD-10-CM

## 2025-05-07 DIAGNOSIS — Z96.651 S/P TOTAL KNEE REPLACEMENT NOT USING CEMENT, RIGHT: ICD-10-CM

## 2025-05-07 PROCEDURE — 97110 THERAPEUTIC EXERCISES: CPT

## 2025-05-07 PROCEDURE — 99024 POSTOP FOLLOW-UP VISIT: CPT | Performed by: ORTHOPAEDIC SURGERY

## 2025-05-07 PROCEDURE — 73562 X-RAY EXAM OF KNEE 3: CPT

## 2025-05-07 PROCEDURE — 97140 MANUAL THERAPY 1/> REGIONS: CPT

## 2025-05-07 NOTE — TELEPHONE ENCOUNTER
Patient came to office to fill out authorization page for us to request medical records. Faxed to # listed on form and received fax confirmation. Scanned into media as well. DEJUAN

## 2025-05-07 NOTE — PROGRESS NOTES
Name: Keesha Griffith      : 1965      MRN: [id number]  Encounter Provider: Kamlesh Richter DO  Encounter Date: 2025   Encounter department: [facility]  :  Assessment & Plan  S/P total knee replacement not using cement, right    Orders:    XR knee 3 vw right non injury; Future         Keesha Griffith is a pleasant 60 y.o. female who presents today for 2-week follow-up evaluation of her right knee total arthroplasty performed on 2025.  Upon examination I am pleased with her progress she can begin to shower, she must not scrub or submerge the incision.  She will continue with her physical therapy, she will not drive for another week until she has fully stopped taking oxycodone and is to work with physical therapy to address driving concerns.  She can continue to utilize her cane for ambulation however I stressed the importance of proper mechanics with heel-to-toe strike.  She will continue her DVT aspirin prophylaxis for the remaining 4 weeks, she will keep her leg elevated when resting with her toes above the level of her heart to address swelling and stiffness.  She will follow-up in 4 weeks for reevaluation, all of her questions and concerns were addressed today.      Return in about 4 weeks (around 2025).    I have personally reviewed pertinent imaging in PACS.  X-rays of the right knee obtained on 2025 demonstrate a right total knee arthroplasty with no signs of loosening, lytic or blastic lesions or osseous abnormalities.    History: Keesha Griffith is a 60 y.o. female who presents today for 2-week follow-up evaluation of her right knee total arthroplasty performed on 2025.  Patient states that she is doing well, she is participating in formal physical therapy and also states she is progressing well.  She is currently ambulating with a cane, she feels unsteady without it and her balance has not fully returned.  She will occasionally have discomfort after  "activity before bed, she will utilize Tylenol for this, she is not taking oxycodone, she is compliant with using aspirin for DVT prophylaxis.  She denies any distal paresthesias or recent injury or trauma.        Estimated body mass index is 33.48 kg/m² as calculated from the following:    Height as of this encounter: 5' 2.5\" (1.588 m).    Weight as of this encounter: 84.4 kg (186 lb).    Lab Results   Component Value Date    HGBA1C 5.1 04/07/2025       Social History     Occupational History    Not on file   Tobacco Use    Smoking status: Never     Passive exposure: Past    Smokeless tobacco: Never   Vaping Use    Vaping status: Never Used   Substance and Sexual Activity    Alcohol use: Yes     Comment: occ    Drug use: Never    Sexual activity: Not on file       Objective:  Right Knee Exam     Tenderness   The patient is experiencing no tenderness.     Range of Motion   Right knee extension: 5 active, 2 passivley.   Flexion:  100 (100)     Tests   Varus: negative Valgus: negative    Other   Erythema: absent  Scars: present  Sensation: normal  Pulse: present  Swelling: none  Effusion: no effusion present    Comments:  Well-healed anterior surgical scar, no signs of infection warmth erythema  No distal paresthesia  Homans' sign negative          Observations     Right Knee   Negative for effusion.       There were no vitals filed for this visit.    Subjective:  Past Medical History:   Diagnosis Date    Disease of thyroid gland     GERD (gastroesophageal reflux disease)     Hyperlipidemia        Past Surgical History:   Procedure Laterality Date    BREAST SURGERY Left     marker    CHOLECYSTECTOMY      lap    [place] ARTHRP KNE CONDYLE&PLATU MEDIAL&LAT COMPARTMENTS Right 4/21/2025    Procedure: ARTHROPLASTY KNEE TOTAL W ROBOT - RIGHT - PRESS FIT - SAME DAY;  Surgeon: Kamlesh Richter DO;  Location: [facility];  Service: Orthopedics    WISDOM TOOTH EXTRACTION         Family History   Problem Relation Age of Onset    Cancer " Father     Hypertension Father          Current Outpatient Medications:     acetaminophen (TYLENOL) 650 mg CR tablet, Take 1 tablet (650 mg total) by mouth every 8 (eight) hours as needed for mild pain, Disp: 30 tablet, Rfl: 0    ascorbic acid (VITAMIN C) 500 MG tablet, Take 1 tablet (500 mg total) by mouth 2 (two) times a day, Disp: 60 tablet, Rfl: 0    aspirin 325 mg tablet, Take 1 tablet (325 mg total) by mouth 2 (two) times a day, Disp: 84 tablet, Rfl: 0    Biotin 2.5 MG TABS, Take by mouth, Disp: , Rfl:     Cholecalciferol (VITAMIN D3) 1,000 units tablet, Take 1 tablet (1,000 Units total) by mouth daily, Disp: 30 tablet, Rfl: 0    docusate sodium (COLACE) 100 mg capsule, Take 1 capsule (100 mg total) by mouth 2 (two) times a day, Disp: 60 capsule, Rfl: 0    ferrous sulfate 324 (65 Fe) mg, Take 1 tablet (324 mg total) by mouth daily before breakfast, Disp: 30 tablet, Rfl: 0    folic acid (FOLVITE) 1 mg tablet, Take 1 tablet (1 mg total) by mouth daily, Disp: 30 tablet, Rfl: 0    levothyroxine 75 mcg tablet, Take 75 mcg by mouth daily Unsure of dose, Disp: , Rfl:     mupirocin (BACTROBAN) 2 % ointment, Apply topically 2 (two) times a day, Disp: 15 g, Rfl: 0    ondansetron (ZOFRAN) 4 mg tablet, Take 1 tablet (4 mg total) by mouth every 8 (eight) hours as needed for nausea or vomiting, Disp: 20 tablet, Rfl: 0    ondansetron (ZOFRAN-ODT) 4 mg disintegrating tablet, Take 1 tablet (4 mg total) by mouth every 6 (six) hours as needed for nausea or vomiting, Disp: 20 tablet, Rfl: 0    rosuvastatin (CRESTOR) 10 MG tablet, Will start after surgery, Disp: , Rfl:     zinc sulfate (ZINCATE) 220 mg capsule, Take 1 capsule (220 mg total) by mouth daily, Disp: 30 capsule, Rfl: 0    celecoxib (CeleBREX) 100 mg capsule, Take 1 capsule (100 mg total) by mouth 2 (two) times a day for 14 days, Disp: 28 capsule, Rfl: 0    oxyCODONE (Roxicodone) 5 immediate release tablet, Take 1 tablet (5 mg total) by mouth every 4 (four) hours as  needed for moderate pain Max Daily Amount: 30 mg (Patient not taking: Reported on 5/7/2025), Disp: 40 tablet, Rfl: 0    No Known Allergies    Review of Systems   Constitutional:  Positive for activity change. Negative for chills, fever and unexpected weight change.   HENT:  Negative for hearing loss, nosebleeds and sore throat.    Eyes:  Negative for pain, redness and visual disturbance.   Respiratory:  Negative for cough, shortness of breath and wheezing.    Cardiovascular:  Negative for chest pain, palpitations and leg swelling.   Gastrointestinal:  Negative for abdominal pain, nausea and vomiting.   Endocrine: Negative for polydipsia and polyuria.   Genitourinary:  Negative for dysuria and hematuria.   Musculoskeletal:  See HPI  Skin:  Negative for rash and wound.   Neurological:  Negative for dizziness, numbness and headaches.   Psychiatric/Behavioral:  Negative for decreased concentration and suicidal ideas. The patient is not nervous/anxious.      Physical Exam  Vitals and nursing note reviewed.   Constitutional:       Appearance: Normal appearance. She is well-developed.   HENT:      Head: Normocephalic and atraumatic.      Right Ear: External ear normal.      Left Ear: External ear normal.   Eyes:      General: No scleral icterus.     Extraocular Movements: Extraocular movements intact.      Conjunctiva/sclera: Conjunctivae normal.   Cardiovascular:      Rate and Rhythm: Normal rate.   Pulmonary:      Effort: Pulmonary effort is normal. No respiratory distress.   Musculoskeletal:      Cervical back: Normal range of motion and neck supple.      Comments: See Ortho exam   Skin:     General: Skin is warm and dry.   Neurological:      General: No focal deficit present.      Mental Status: She is alert and oriented to person, place, and time.   Psychiatric:         Behavior: Behavior normal.     Scribe Attestation      I,:  Stephon Roy am acting as a scribe while in the presence of the attending physician.:        I,:  Kamlesh Richter, DO personally performed the services described in this documentation    as scribed in my presence.:           This document was created using speech voice recognition software.   Grammatical errors, random word insertions, pronoun errors, and incomplete sentences are an occasional consequence of this system due to software limitations, ambient noise, and hardware issues.   Any formal questions or concerns about content, text, or information contained within the body of this dictation should be directly addressed to the provider for clarification.

## 2025-05-07 NOTE — PROGRESS NOTES
Daily Note     Today's date: 2025  Patient name: Keesha Griffith  : 1965  MRN: [id number]  Referring provider: Kamlesh Richter DO  Dx:   Encounter Diagnosis     ICD-10-CM    1. Status post right knee replacement  Z96.651       2. Chronic pain of right knee  M25.561     G89.29       3. Primary osteoarthritis of right knee  M17.11                        Subjective: Patient reports she is able to lift her leg into the bed now       Objective: See treatment diary below      Assessment: Tolerated treatment well with some hypomobility noted with patellofemoral mobs. Patient would benefit from continued PT to further improved ROM, functional mobility, LE strength, and balance.       Plan: Continue per plan of care.      Precautions:   Past Medical History:   Diagnosis Date    Disease of thyroid gland     GERD (gastroesophageal reflux disease)     Hyperlipidemia      Past Surgical History:   Procedure Laterality Date    BREAST SURGERY Left     marker    CHOLECYSTECTOMY      lap    [place] ARTHRP KNE CONDYLE&PLATU MEDIAL&LAT COMPARTMENTS Right 2025    Procedure: ARTHROPLASTY KNEE TOTAL W ROBOT - RIGHT - PRESS FIT - SAME DAY;  Surgeon: Kamlesh Richter DO;  Location: [facility];  Service: Orthopedics    WISDOM TOOTH EXTRACTION         DOS: scheduled for 25  SOC: 4/10/2025  FOTO: 2025  POC Expiration: 2025  Daily Treatment Log:  Date 2025   Visit # 6 FOTO  7  4 5   Auth         Auth exp        Manual  5'  5' 15'   Knee ROM  Extension: -6 at rest    Flexion: 100 AAROM Extension: -6 at rest    Flexion: 110 AAROM  Extension: -5 w/ QS    Flexion: 105 Extension -8, Flexion 90 degrees start of session   Skin inspection  Noted some peeling glue but otherwise no issues and incision appears to be healing well.    Incision remains well healing with no signs of infection.     PROM     Knee extension   Mobs  PF - all directions    PF superior   There Exer 35' 30'  40' 25'   Recumb Bike  "for ROM  5' min half moons 5' min half moon to full rev   Half moons 3'  5' min half moons   Quad set  5\" 1x10 ; 2x 5\" x10   3\" x10  5\"x20   Heel slides  5\" x10  5\" x10   5\" 2x5  w/ ball SOS 10\"x10   Glute set  5\" x10    3\" x10  3\"x10   Supine gastroc stretch w/ sos  10\" x5  10\" x5   10\" x5    LAQ  2x5 2x5   1x5 limited ROM  1x10    Supine hip flexor stretch         Seated knee flexion w/ opp OP  5\" x10  5\" x10   5\" x10  5\" x10    Seated HS stretch  5\" x5  5\" x10   5\" x5     Pt edu        HEP  Update this session   Updated and discussed     There Activ        STS  5'      FSU   4\" step 1x10 R. Uni rail and SPC support       LSU                                 NMReed 5'       Hooklying clamshell         Weight shifts  3\" 1x10 ML    1x10 M/L     HR/TR  1x10 HR  1x10 HR/TR                       Modalities                                HEP:   Access Code: [email or web address]  URL: [email or web address]  Date: 04/24/2025  Prepared by: Catalina Faustin    Exercises  - Supine Quad Set  - 1 x daily - 7 x weekly - 1 sets - 10 reps - 5 sec hold  - Supine Heel Slide with Strap  - 1 x daily - 7 x weekly - 1 sets - 10 reps  - Seated Long Arc Quad  - 1 x daily - 7 x weekly - 1 sets - 10 reps  - Long Sitting Calf Stretch with Strap  - 1 x daily - 7 x weekly - 5 reps - 10 sec hold  - Standing Hip Flexor Stretch  - 1 x daily - 7 x weekly - 5 reps - 10 sec hold       "

## 2025-05-08 NOTE — TELEPHONE ENCOUNTER
Upon review of the In Basket request we were able to locate, review, and update the patient chart as requested for Mammogram.   11-14-24  Any additional questions or concerns should be emailed to the Practice Liaisons via the appropriate education email address, please do not reply via In Basket.    Thank you  Lazara Davis MA   [facility]

## 2025-05-09 ENCOUNTER — OFFICE VISIT (OUTPATIENT)
Dept: PHYSICAL THERAPY | Facility: CLINIC | Age: 60
End: 2025-05-09
Payer: COMMERCIAL

## 2025-05-09 DIAGNOSIS — G89.29 CHRONIC PAIN OF RIGHT KNEE: ICD-10-CM

## 2025-05-09 DIAGNOSIS — M17.11 PRIMARY OSTEOARTHRITIS OF RIGHT KNEE: ICD-10-CM

## 2025-05-09 DIAGNOSIS — Z96.651 STATUS POST RIGHT KNEE REPLACEMENT: Primary | ICD-10-CM

## 2025-05-09 DIAGNOSIS — M25.561 CHRONIC PAIN OF RIGHT KNEE: ICD-10-CM

## 2025-05-09 PROCEDURE — 97110 THERAPEUTIC EXERCISES: CPT

## 2025-05-09 PROCEDURE — 97140 MANUAL THERAPY 1/> REGIONS: CPT

## 2025-05-09 NOTE — PROGRESS NOTES
Daily Note     Today's date: 2025  Patient name: Keesha Griffith  : 1965  MRN: [id number]  Referring provider: Kamlesh Richter DO  Dx:   Encounter Diagnosis     ICD-10-CM    1. Status post right knee replacement  Z96.651       2. Chronic pain of right knee  M25.561     G89.29       3. Primary osteoarthritis of right knee  M17.11                          Subjective: Patient reports she is sore/stiff today.      Objective: See treatment diary below      Assessment: Tolerated treatment well with some hypomobility noted with patellofemoral mobs sup and inf. Patient reported decrease in stiffness following light distraction. Patient would benefit from continued PT to further improved ROM, functional mobility, LE strength, and balance.       Plan: Continue per plan of care.      Precautions:   Past Medical History:   Diagnosis Date    Disease of thyroid gland     GERD (gastroesophageal reflux disease)     Hyperlipidemia      Past Surgical History:   Procedure Laterality Date    BREAST SURGERY Left     marker    CHOLECYSTECTOMY      lap    [place] ARTHRP KNE CONDYLE&PLATU MEDIAL&LAT COMPARTMENTS Right 2025    Procedure: ARTHROPLASTY KNEE TOTAL W ROBOT - RIGHT - PRESS FIT - SAME DAY;  Surgeon: Kamlesh Richter DO;  Location: [facility];  Service: Orthopedics    WISDOM TOOTH EXTRACTION         DOS: scheduled for 25  SOC: 4/10/2025  FOTO: 2025  POC Expiration: 2025  Daily Treatment Log:  Date 2025     Visit # 6 FOTO  7 8     Auth         Auth exp        Manual  5' 10'     Knee ROM  Extension: -6 at rest    Flexion: 100 AAROM Extension: -6 at rest    Flexion: 110 AAROM      Skin inspection  Noted some peeling glue but otherwise no issues and incision appears to be healing well.        PROM        Mobs  PF - all directions  PF sup and inf      Distraction with belt seated    EG     There Exer 35' 30' 20'     Recumb Bike for ROM  5' min half moons 5' min half moon to full rev  5' min half  "moons     Quad set  5\" 1x10 ; 2x 5\" x10  5\" x10      Heel slides  5\" x10  5\" x10  10\" x5     Glute set  5\" x10   5\" x10      Supine gastroc stretch w/ sos  10\" x5  10\" x5  10\" x5      LAQ  2x5 2x5  W/ add 1x10      Supine hip flexor stretch         Seated knee flexion w/ opp OP  5\" x10  5\" x10  10\" x5      Seated HS stretch  5\" x5  5\" x10  5\" x10      Pt edu        HEP        There Activ        STS  5' 10'     FSU   4\" step 1x10 R. Uni rail and SPC support  4\" step R. X2 then inc pain. Uni rail and SPC support      LSU         Ambulation w/ SPC    50ft with VC for form - heel to toe and appropriate step through                      NMReed 5'       Hooklying clamshell    RTB 1x10      Weight shifts  3\" 1x10 ML        HR/TR  1x10 HR  1x10 HR/TR  1x10 HR/TR                     Modalities                                HEP:   Access Code: [email or web address]  URL: [email or web address]  Date: 04/24/2025  Prepared by: Catalina Faustin    Exercises  - Supine Quad Set  - 1 x daily - 7 x weekly - 1 sets - 10 reps - 5 sec hold  - Supine Heel Slide with Strap  - 1 x daily - 7 x weekly - 1 sets - 10 reps  - Seated Long Arc Quad  - 1 x daily - 7 x weekly - 1 sets - 10 reps  - Long Sitting Calf Stretch with Strap  - 1 x daily - 7 x weekly - 5 reps - 10 sec hold  - Standing Hip Flexor Stretch  - 1 x daily - 7 x weekly - 5 reps - 10 sec hold       "

## 2025-05-12 ENCOUNTER — OFFICE VISIT (OUTPATIENT)
Dept: PHYSICAL THERAPY | Facility: CLINIC | Age: 60
End: 2025-05-12
Attending: ORTHOPAEDIC SURGERY
Payer: COMMERCIAL

## 2025-05-12 DIAGNOSIS — M17.11 PRIMARY OSTEOARTHRITIS OF RIGHT KNEE: ICD-10-CM

## 2025-05-12 DIAGNOSIS — G89.29 CHRONIC PAIN OF RIGHT KNEE: ICD-10-CM

## 2025-05-12 DIAGNOSIS — Z96.651 STATUS POST RIGHT KNEE REPLACEMENT: Primary | ICD-10-CM

## 2025-05-12 DIAGNOSIS — M25.561 CHRONIC PAIN OF RIGHT KNEE: ICD-10-CM

## 2025-05-12 PROCEDURE — 97110 THERAPEUTIC EXERCISES: CPT

## 2025-05-12 PROCEDURE — 97140 MANUAL THERAPY 1/> REGIONS: CPT

## 2025-05-12 PROCEDURE — 97112 NEUROMUSCULAR REEDUCATION: CPT

## 2025-05-13 ENCOUNTER — TELEPHONE (OUTPATIENT)
Age: 60
End: 2025-05-13

## 2025-05-13 NOTE — TELEPHONE ENCOUNTER
Patient checking to see that STD forms were received and correct surgery date will be used. Advised it will list SX date of 4/21-no additional questions

## 2025-05-14 ENCOUNTER — OFFICE VISIT (OUTPATIENT)
Dept: PHYSICAL THERAPY | Facility: CLINIC | Age: 60
End: 2025-05-14
Attending: ORTHOPAEDIC SURGERY
Payer: COMMERCIAL

## 2025-05-14 DIAGNOSIS — Z96.651 STATUS POST RIGHT KNEE REPLACEMENT: Primary | ICD-10-CM

## 2025-05-14 DIAGNOSIS — M17.11 PRIMARY OSTEOARTHRITIS OF RIGHT KNEE: ICD-10-CM

## 2025-05-14 DIAGNOSIS — M25.561 CHRONIC PAIN OF RIGHT KNEE: ICD-10-CM

## 2025-05-14 DIAGNOSIS — G89.29 CHRONIC PAIN OF RIGHT KNEE: ICD-10-CM

## 2025-05-14 PROCEDURE — 97110 THERAPEUTIC EXERCISES: CPT

## 2025-05-14 PROCEDURE — 97140 MANUAL THERAPY 1/> REGIONS: CPT

## 2025-05-14 NOTE — PROGRESS NOTES
Daily Note     Today's date: 2025  Patient name: Keesha Griffith  : 1965  MRN: [id number]  Referring provider: Kamlesh Richter DO  Dx:   Encounter Diagnosis     ICD-10-CM    1. Status post right knee replacement  Z96.651       2. Chronic pain of right knee  M25.561     G89.29       3. Primary osteoarthritis of right knee  M17.11                      Subjective: pt has no new complaints on arrival to session       Objective: See treatment diary below      Assessment: Tolerated treatment well. Pt dem increased lateral calf/knee pain w/ transfers into leg press and on mat today, this was dec w/ stick rolling/stretching. Pt was advised to cont w/ this as needed. Pt edu she was walking on a bent knee for a long time and cont to slowly increase the ROM/stretching of the post structures and antalgic gait which can irritate/tighten the soft tissues causing pain. Patient would benefit from continued PT      Plan: Continue per plan of care.      Precautions:   Past Medical History:   Diagnosis Date    Disease of thyroid gland     GERD (gastroesophageal reflux disease)     Hyperlipidemia      Past Surgical History:   Procedure Laterality Date    BREAST SURGERY Left     marker    CHOLECYSTECTOMY      lap    [place] ARTHRP KNE CONDYLE&PLATU MEDIAL&LAT COMPARTMENTS Right 2025    Procedure: ARTHROPLASTY KNEE TOTAL W ROBOT - RIGHT - PRESS FIT - SAME DAY;  Surgeon: Kamlesh Richter DO;  Location: [facility];  Service: Orthopedics    WISDOM TOOTH EXTRACTION         DOS: scheduled for 25  SOC: 4/10/2025  FOTO: 2025  POC Expiration: 2025  Daily Treatment Log:  Date 2025   Visit # 6 FOTO  7 8 9  10    Auth         Auth exp        Manual  5' 10' 10'  10'    Knee ROM  Extension: -6 at rest    Flexion: 100 AAROM Extension: -6 at rest    Flexion: 110 AAROM  Flex AAROM supine: 108    Ext qset: -5 Flex AAROM supine: 105    Prone AAROM flex: 75   Skin inspection  Noted some peeling  "glue but otherwise no issues and incision appears to be healing well.        Stick rolling to R lateral calf      RB    PROM        Mobs  PF - all directions  PF sup and inf  RB sup/inf    Distraction with belt seated    EG RB     There Exer 35' 30' 20' 20'  30'    Recumb Bike for ROM  5' min half moons 5' min half moon to full rev  5' min half moons 5' min half moons to full rev  5' min half moons to full rev     Quad set  5\" 1x10 ; 2x 5\" x10  5\" x10  5\"x10  Prone 5\"x10    Heel slides  5\" x10  5\" x10  10\" x5 10\"x5  20\"x5    Glute set  5\" x10   5\" x10  Bridges 3\"x10; 2x  Bridges 3\"x10' 2x    Supine gastroc stretch w/ sos  10\" x5  10\" x5  10\" x5  10\"x5  30\"x3 after stick rolling    LAQ  2x5 2x5  W/ add 1x10  W/ add 1x10  3\"x10 a   Prone quad stretch w/ sos      20\"x3    Prone AROM flex      1x10    Supine hip flexor stretch         Seated knee flexion w/ opp OP  5\" x10  5\" x10  10\" x5  10\"x5     Seated HS stretch  5\" x5  5\" x10  5\" x10  10\"x5  Supine w/ sos 15\"x4    Heel prop on chair for knee ext     60\"     Pt edu        HEP        There Activ        STS  5' 10'     FSU   4\" step 1x10 R. Uni rail and SPC support  4\" step R. X2 then inc pain. Uni rail and SPC support      LSU         Ambulation w/ SPC    50ft with VC for form - heel to toe and appropriate step through                      NMReed 5'   10'     Hooklying clamshell    RTB 1x10  RTB 1x10; 2x     Alt butt kicks     1x10 R/L     Weight shifts  3\" 1x10 ML    Std alt hip abd at rail  1x10 R/L     HR/TR  1x10 HR  1x10 HR/TR  1x10 HR/TR 1x10 HR/TR                     Modalities                                HEP:   Access Code: [email or web address]  URL: [email or web address]  Date: 04/24/2025  Prepared by: Catalina Faustin    Exercises  - Supine Quad Set  - 1 x daily - 7 x weekly - 1 sets - 10 reps - 5 sec hold  - Supine Heel Slide with Strap  - 1 x daily - 7 x weekly - 1 sets - 10 reps  - Seated Long Arc Quad  - 1 x daily - 7 x weekly - 1 sets - 10 reps  - " Long Sitting Calf Stretch with Strap  - 1 x daily - 7 x weekly - 5 reps - 10 sec hold  - Standing Hip Flexor Stretch  - 1 x daily - 7 x weekly - 5 reps - 10 sec hold

## 2025-05-15 ENCOUNTER — TELEPHONE (OUTPATIENT)
Dept: OBGYN CLINIC | Facility: HOSPITAL | Age: 60
End: 2025-05-15

## 2025-05-15 ENCOUNTER — TELEPHONE (OUTPATIENT)
Age: 60
End: 2025-05-15

## 2025-05-15 NOTE — TELEPHONE ENCOUNTER
Caller: Patient    Call back#: [phone]    Best call back time am/pm/all day: all day    Doctor: Dr Richter    Surgery: yes    Date of Surgery: 4/21/25    Reason for call: Patient was at PT 5/14 when she felt tightness in her thigh. PT rubbed her thigh-ruled out a blood clot. Patient has been rolling out her thigh. She questioned if she could take Tylenol and rub ointment on her thigh?       Urgent matters - Please Teams message Nurse Navigator Team Chat & send phone note to Orthopedic Nurse Navigator Pool as high priority before transferring call.   Non-Urgent matters - Please send a phone note to Orthopedic Nurse Navigator Pool only. Nurse Navigators will call patients back asap.

## 2025-05-16 ENCOUNTER — EVALUATION (OUTPATIENT)
Dept: PHYSICAL THERAPY | Facility: CLINIC | Age: 60
End: 2025-05-16
Payer: COMMERCIAL

## 2025-05-16 DIAGNOSIS — Z96.651 STATUS POST RIGHT KNEE REPLACEMENT: Primary | ICD-10-CM

## 2025-05-16 DIAGNOSIS — G89.29 CHRONIC PAIN OF RIGHT KNEE: ICD-10-CM

## 2025-05-16 DIAGNOSIS — M25.561 CHRONIC PAIN OF RIGHT KNEE: ICD-10-CM

## 2025-05-16 DIAGNOSIS — M17.11 PRIMARY OSTEOARTHRITIS OF RIGHT KNEE: ICD-10-CM

## 2025-05-16 PROCEDURE — 97140 MANUAL THERAPY 1/> REGIONS: CPT

## 2025-05-16 PROCEDURE — 97110 THERAPEUTIC EXERCISES: CPT

## 2025-05-16 NOTE — PROGRESS NOTES
PT post op Re-evaluation     Today's date: 2025  Patient name: Keesha Griffith  : 1965  MRN: [id number]  Referring provider: Kamlesh Richter DO  Dx:   Encounter Diagnosis     ICD-10-CM    1. Status post right knee replacement  Z96.651       2. Chronic pain of right knee  M25.561     G89.29       3. Primary osteoarthritis of right knee  M17.11                          Assessment  Impairments: abnormal gait, abnormal or restricted ROM, impaired balance, impaired physical strength and pain with function    Assessment details: Keesha Griffith is a 60 y.o. female who presents for post op re-eval for R TKE performed on 2025. Patient presents with improvements in RLE strength,  R knee ROM, and ambulation tolerance with SPC, however deficits are still present. Due to these impairments, patient has difficulty performing ADL's, recreational activities, work-related activities, ambulation, stair negotiation, transfers. Patient's clinical presentation is consistent with their referring diagnosis of  S/P R TKA, Primary osteoarthritis of right knee and Chronic pain of right knee. Patient has been educated in gait mechanics w/ SPC, home exercise program, and plan of care. Patient would benefit from skilled physical therapy services to address their aforementioned functional limitations and progress towards prior level of function and independence with home exercise program.         Goals  Knee   Short Term Goals:  Target Date 4 weeks   STG1. Initiate and advance HEP to maximize progress between therapy sessions---met  STG2. Pt will demo AROM  B/L knee to 0-120 degrees to allow pt to transfer in/out of the car w/o difficulty --progressing towards  STG3. Improve B/L LE strength to 4/5 to allow pt to raise from sit to stand w/o UE assist.--progressing towards  STG4. Reduce pain w/ WB activity to 0-4/10 to improve functional independence. --met     Long Term Goals:  Target Date 12 weeks --progressing towards all   LTG1. Pt to  be Indep with HEP  to maximize progress between therapy sessions and improve functional mobility  LTG2. Pt will demo knee ROM of 0-130 or greater needed for reciprocal stairs w/ handrail w/o pain and to don shoes/socks independently.  LTG3. Pt to tolerate prolonged walking on uneven terrain w/o asst device.  LTG4. Pt to demo LE strength of 4+/5 or better such that pt can perform reciprocal stair negotiation  LTG5: Pt will improve standing tolerance to 30 min or more as per PLOF to return to work related tasks    Plan  Patient would benefit from: skilled physical therapy  Planned modality interventions: cryotherapy, thermotherapy: hydrocollator packs and unattended electrical stimulation    Planned therapy interventions: balance/weight bearing training, joint mobilization, neuromuscular re-education, patient education, self care, therapeutic activities, therapeutic exercise, functional ROM exercises, home exercise program, IASTM, manual therapy, abdominal trunk stabilization, patient/caregiver education and postural training    Frequency: 2-3x week  Plan of Care beginning date: 4/10/2025  Plan of Care expiration date: 7/17/2025  Treatment plan discussed with: patient  Plan details: HEP development, stretching, strengthening, A/AA/PROM, joint mobilizations, posture education, STM/MI as needed to reduce muscle tension, muscle reeducation, PLOC discussed and agreed upon with patient.         Subjective Evaluation    History of Present Illness  Mechanism of injury: Patient reports to PT for post op TKA evaluation performed on 4/21/2025. Patient reports she felt like she couldn't get as far with heel slides with HEP yesterday. Patient has some irritation in the calf with lifting or turning, but she is also having back pain for the last few days which may be related. Patient does feel like her motion is getting better and she can now get in and out of the tub/shower without assist.   Patient Goals  Patient goals for  "therapy: decreased edema, decreased pain, improved balance, increased motion, independence with ADLs/IADLs, increased strength, return to sport/leisure activities and return to work    Pain  Current pain ratin  At best pain ratin  At worst pain ratin  Location: R knee and calf  Quality: dull ache and sharp  Aggravating factors: walking, stair climbing and standing    Social Support  Steps to enter house: yes  12  Stairs in house: yes   12  Lives in: multiple-level home  Lives with: parents (daughter will be around often to help)    Employment status: working ()    Diagnostic Tests  Abnormal x-ray: Severe bilateral knee osteoarthritis predominantly in the medial compartments..        Objective        Knee AROM: deg      R  L  R  R  Date:    4/10  4/10  4/24  5/16  Flexion: (sup)    120  125  90  110A/ 115AA  Extension: (sup)  -10  0  -10  -5/ QS      Strength: MMT  Hip    R  L  R  R  Date:     4/10  4/10  4/24  5/16  Flexion(sup)   5/  5/5  3-/5  3/5  Abduction(S/L)  5/  5/5  NT  3/5    Knee    R  L  R  R  Date:     4/10  4/10  4/24  5/16  Extension(Seated)  4/5  5/5  3/5  4-/5  Flexion(Seated)  4/5  5/5  4-/5  4/5    Ankle    R  L  R  R  Date:     4/10  4/10  4/24  5/16  Dorsiflexion(seated)  5/5  5/5  5/5  5/5  Plantarflexion(seated)  5/5  5/5  5/5  5/5        Tenderness/Palpation: mild TTP near incision        Function  2025:  Gait: antalgic gait pattern with use of SPC, limited knee flexion with swing phase and lack of TKE in stance on R.   Transfers: able to perform supine to/from sit and car transfers with no assistance   Stairs: patient demo good form with \"up with the good and down with the bad\" with stair negotiation w/ SPC and rail. Able to ascend and descend 4\" step with decreased control w/ uni rail and SPC.   Curb: patient demo good understanding of \"up with the good and down with the bad\" with stair negotiation w/ SPC     2025:   Gait: mild antalgic gait pattern " "with use of RW   Transfers: able to perform supine to/from sit and car transfers with no assistance   Stairs: patient demo good form with \"up with the good and down with the bad\" with stair negotiation w/ SPC and rail   Curb: patient demo good understanding of \"up with the good and down with the bad\" with stair negotiation w/ RW     4/10/2025  Gait: mild antalgic gait pattern with with limited TKE in terminal stance and decreased stance time on RLE   Transfers: able to perform supine to/from sit and car transfers with no assistance   Stairs: patient demo good understanding of \"up with the good and down with the bad\" with stair negotiation w/ RW And SPC   Curb: patient demo good understanding of \"up with the good and down with the bad\" with stair negotiation w/ RW And SPC     TU25: TBA   Post op 2025: 34.06 sec w/ RW and B/L UE push off     Homens:  2025: neg on R          Precautions:   Past Medical History:   Diagnosis Date    Disease of thyroid gland     GERD (gastroesophageal reflux disease)     Hyperlipidemia      Past Surgical History:   Procedure Laterality Date    BREAST SURGERY Left     marker    CHOLECYSTECTOMY      lap    DE ARTHRP KNE CONDYLE&PLATU MEDIAL&LAT COMPARTMENTS Right 2025    Procedure: ARTHROPLASTY KNEE TOTAL W ROBOT - RIGHT - PRESS FIT - SAME DAY;  Surgeon: Kamlesh Richter DO;  Location: [facility];  Service: Orthopedics    WISDOM TOOTH EXTRACTION       DOS: 25  SOC: 4/10/2025  FOTO: 2025  POC Expiration: 2025  Daily Treatment Log:  Date 2025   Visit # 11 (RE/FOTO)   8 9  10    Auth         Auth exp        Manual 10'  10' 10'  10'    Knee ROM  See obj sectio n   Flex AAROM supine: 108    Ext qset: -5 Flex AAROM supine: 105    Prone AAROM flex: 75   Skin inspection         Stick rolling to R lateral calf  EG    RB    PROM        Mobs   PF sup and inf  RB sup/inf    Distraction with belt seated    EG RB     There Exer 30'  " "20' 20'  30'    Recumb Bike for ROM  5' min half moons to full rev    5' min half moons 5' min half moons to full rev  5' min half moons to full rev     Quad set    5\" x10  5\"x10  Prone 5\"x10    Heel slides  20\" x5  10\" x5 10\"x5  20\"x5    Glute set    5\" x10  Bridges 3\"x10; 2x  Bridges 3\"x10' 2x    Supine gastroc stretch w/ sos  30\"x3 after stick rolling   10\" x5  10\"x5  30\"x3 after stick rolling    LAQ    W/ add 1x10  W/ add 1x10  3\"x10 a   Prone quad stretch w/ sos  10\" x5 R     20\"x3    Prone AROM flex      1x10    Supine hip flexor stretch         Seated knee flexion w/ opp OP    10\" x5  10\"x5     Seated HS stretch  Supine w/ sos 15\"x4   5\" x10  10\"x5  Supine w/ sos 15\"x4    Heel prop on chair for knee ext     60\"     Pt edu        HEP  Update this session       Objective measures  EG       There Activ        STS   10'     FSU    4\" step R. X2 then inc pain. Uni rail and SPC support      LSU         Ambulation w/ SPC    50ft with VC for form - heel to toe and appropriate step through                      NMReed    10'     Hooklying clamshell    RTB 1x10  RTB 1x10; 2x     Alt butt kicks     1x10 R/L     Weight shifts     Std alt hip abd at rail  1x10 R/L     HR/TR    1x10 HR/TR 1x10 HR/TR             Mechanical assessment   Perf this session as needed       Modalities                                HEP:   Access Code: [email or web address]  URL: [email or web address]  Date: 04/24/2025  Prepared by: Catalina Faustin    Exercises  - Supine Quad Set  - 1 x daily - 7 x weekly - 1 sets - 10 reps - 5 sec hold  - Supine Heel Slide with Strap  - 1 x daily - 7 x weekly - 1 sets - 10 reps  - Seated Long Arc Quad  - 1 x daily - 7 x weekly - 1 sets - 10 reps  - Long Sitting Calf Stretch with Strap  - 1 x daily - 7 x weekly - 5 reps - 10 sec hold  - Standing Hip Flexor Stretch  - 1 x daily - 7 x weekly - 5 reps - 10 sec hold           "

## 2025-05-19 ENCOUNTER — OFFICE VISIT (OUTPATIENT)
Dept: PHYSICAL THERAPY | Facility: CLINIC | Age: 60
End: 2025-05-19
Attending: ORTHOPAEDIC SURGERY
Payer: COMMERCIAL

## 2025-05-19 DIAGNOSIS — M17.11 PRIMARY OSTEOARTHRITIS OF RIGHT KNEE: ICD-10-CM

## 2025-05-19 DIAGNOSIS — Z96.651 STATUS POST RIGHT KNEE REPLACEMENT: Primary | ICD-10-CM

## 2025-05-19 DIAGNOSIS — M25.561 CHRONIC PAIN OF RIGHT KNEE: ICD-10-CM

## 2025-05-19 DIAGNOSIS — G89.29 CHRONIC PAIN OF RIGHT KNEE: ICD-10-CM

## 2025-05-19 PROCEDURE — 97110 THERAPEUTIC EXERCISES: CPT

## 2025-05-19 PROCEDURE — 97112 NEUROMUSCULAR REEDUCATION: CPT

## 2025-05-19 NOTE — PROGRESS NOTES
Daily Note     Today's date: 2025  Patient name: Keesha Griffith  : 1965  MRN: [id number]  Referring provider: Kamlesh Richter DO  Dx:   Encounter Diagnosis     ICD-10-CM    1. Status post right knee replacement  Z96.651       2. Chronic pain of right knee  M25.561     G89.29       3. Primary osteoarthritis of right knee  M17.11                      Subjective: pt reports that she has been walking at home  w/o the cane. She thinks she over did it yesterday after going full grocery shopping for the first time, she feels a little more swollen this morning. She cont w/ some soreness in the lateral side of her lower leg but she was also rolling it out at home and this was helpful.       Objective: See treatment diary below      Assessment: Tolerated treatment well. Pt dem improved mobility onto leg press and mat today w/o increased sharp R LE pain. Will cont w/ LE stretching and stick rolling as needed. Cont to progress SL strengthening to allow for increased confidence during stair navigation. Patient would benefit from continued PT      Plan: Continue per plan of care.      Precautions:   Past Medical History:   Diagnosis Date    Disease of thyroid gland     GERD (gastroesophageal reflux disease)     Hyperlipidemia      Past Surgical History:   Procedure Laterality Date    BREAST SURGERY Left     marker    CHOLECYSTECTOMY      lap    [place] ARTHRP KNE CONDYLE&PLATU MEDIAL&LAT COMPARTMENTS Right 2025    Procedure: ARTHROPLASTY KNEE TOTAL W ROBOT - RIGHT - PRESS FIT - SAME DAY;  Surgeon: Kamlesh Richter DO;  Location: [facility];  Service: Orthopedics    WISDOM TOOTH EXTRACTION       DOS: 25  SOC: 4/10/2025  FOTO: 2025  POC Expiration: 2025  Daily Treatment Log:  Date 2025   Visit # 11 (RE/FOTO)  12  9  10    Auth         Auth exp        Manual 10'   10'  10'    Knee ROM  See obj sectio n   Flex AAROM supine: 108    Ext qset: -5 Flex AAROM supine: 105    Prone  "AAROM flex: 75   Skin inspection         Stick rolling to R lateral calf  EG RB    RB    PROM        Mobs    RB sup/inf    Distraction with belt seated     RB     There Exer 30' 30'   20'  30'    Recumb Bike for ROM  5' min half moons to full rev   5' min half moons to full rev   5' min half moons to full rev  5' min half moons to full rev     Quad set     5\"x10  Prone 5\"x10    Heel slides  20\" x5 20\"x5   10\"x5  20\"x5    Glute set   Bridges 5\" 2x10   Bridges 3\"x10; 2x  Bridges 3\"x10' 2x    Supine gastroc stretch w/ sos  30\"x3 after stick rolling  Slant board 20\"x3     Soleous stretch on slant board 20\"x3   10\"x5  30\"x3 after stick rolling    LAQ   W/ add 3\"x10   W/ add 1x10  3\"x10 a   Prone quad stretch w/ sos  10\" x5 R  10\"x5 R    20\"x3    Prone AROM flex   1x10    1x10    Supine hip flexor stretch         Seated knee flexion w/ opp OP     10\"x5     Seated HS stretch  Supine w/ sos 15\"x4   Supine w/ sos 15\"x4   10\"x5  Supine w/ sos 15\"x4    Heel prop on chair for knee ext     60\"     Supine SL leg press   20# 2x10 R       Pt edu        HEP        Objective measures  EG       There Activ        STS        FSU   4\" 1x10 R       LSU   4\" 1x10 R       Ambulation w/ SPC                         NMReed  10'   10'     Hooklying clamshell     RTB 1x10; 2x     Alt butt kicks     1x10 R/L     Weight shifts     Std alt hip abd at rail  1x10 R/L     HR/TR   HR off step 2x10   1x10 HR/TR             Mechanical assessment         Modalities                                HEP:   Access Code: [email or web address]  URL: [email or web address]  Date: 04/24/2025  Prepared by: Catalina Faustin    Exercises  - Supine Quad Set  - 1 x daily - 7 x weekly - 1 sets - 10 reps - 5 sec hold  - Supine Heel Slide with Strap  - 1 x daily - 7 x weekly - 1 sets - 10 reps  - Seated Long Arc Quad  - 1 x daily - 7 x weekly - 1 sets - 10 reps  - Long Sitting Calf Stretch with Strap  - 1 x daily - 7 x weekly - 5 reps - 10 sec hold  - Standing Hip Flexor " Stretch  - 1 x daily - 7 x weekly - 5 reps - 10 sec hold

## 2025-05-21 ENCOUNTER — OFFICE VISIT (OUTPATIENT)
Dept: PHYSICAL THERAPY | Facility: CLINIC | Age: 60
End: 2025-05-21
Payer: COMMERCIAL

## 2025-05-21 DIAGNOSIS — M17.11 PRIMARY OSTEOARTHRITIS OF RIGHT KNEE: ICD-10-CM

## 2025-05-21 DIAGNOSIS — M25.561 CHRONIC PAIN OF RIGHT KNEE: ICD-10-CM

## 2025-05-21 DIAGNOSIS — Z96.651 STATUS POST RIGHT KNEE REPLACEMENT: Primary | ICD-10-CM

## 2025-05-21 DIAGNOSIS — G89.29 CHRONIC PAIN OF RIGHT KNEE: ICD-10-CM

## 2025-05-21 PROCEDURE — 97110 THERAPEUTIC EXERCISES: CPT

## 2025-05-21 PROCEDURE — 97140 MANUAL THERAPY 1/> REGIONS: CPT

## 2025-05-21 NOTE — PROGRESS NOTES
"Daily Note     Today's date: 2025  Patient name: Keesha Griffith  : 1965  MRN: [id number]  Referring provider: Kamlesh Richter DO  Dx:   Encounter Diagnosis     ICD-10-CM    1. Status post right knee replacement  Z96.651       2. Chronic pain of right knee  M25.561     G89.29       3. Primary osteoarthritis of right knee  M17.11                      Subjective: pt reports she is feeling sore and stiff today       Objective: See treatment diary below      Assessment: Tolerated treatment well. Pt dem improved bed mobility with less pain with rolling and supine to sit. Some pain/ difficulty with FSU however stated she was able to tolerate it.  Patient would benefit from continued PT      Plan: Continue per plan of care.      Precautions:   Past Medical History:   Diagnosis Date    Disease of thyroid gland     GERD (gastroesophageal reflux disease)     Hyperlipidemia      Past Surgical History:   Procedure Laterality Date    BREAST SURGERY Left     marker    CHOLECYSTECTOMY      lap    [place] ARTHRP KNE CONDYLE&PLATU MEDIAL&LAT COMPARTMENTS Right 2025    Procedure: ARTHROPLASTY KNEE TOTAL W ROBOT - RIGHT - PRESS FIT - SAME DAY;  Surgeon: Kamlesh Richter DO;  Location: [facility];  Service: Orthopedics    WISDOM TOOTH EXTRACTION       DOS: 25  SOC: 4/10/2025  FOTO: 2025  POC Expiration: 2025  Daily Treatment Log:  Date 2025     Visit # 11 (RE/FOTO)  12 13     Auth         Auth exp        Manual 10'  10'     Knee ROM  See obj sectio n       Skin inspection         Stick rolling to R lateral calf  EG RB  EG -post and lateral calf      PROM        Mobs        Distraction with belt seated         There Exer 30' 30'  30'     Recumb Bike for ROM  5' min half moons to full rev   5' min half moons to full rev  5' full rev      Quad set         Heel slides  20\" x5 20\"x5  20\" x5      Bridges   Bridges 5\" 2x10  5\" 2x10      Supine gastroc stretch w/ sos  30\"x3 after stick rolling  " "Slant board 20\"x3     Soleous stretch on slant board 20\"x3  10\" x5 R      Slant board    20\" x3 gastroc B/L     20\" x3 soleous uni       LAQ   W/ add 3\"x10  W/ add 3\" x10      Prone quad stretch w/ sos  10\" x5 R  10\"x5 R  10\"x5 R      Prone AROM flex   1x10  1x10      Supine hip flexor stretch         Seated knee flexion w/ opp OP         Seated HS stretch  Supine w/ sos 15\"x4   Supine w/ sos 15\"x4  Supine w/ sos 15\"x4      Heel prop on chair for knee ext         Supine SL leg press   20# 2x10 R  20# 2x10 R      Pt edu        HEP        Objective measures  EG       There Activ        STS        FSU   4\" 1x10 R  4\" 1x10 R      LSU   4\" 1x10 R       Ambulation w/ SPC                         NMReed  10'       Hooklying clamshell         Alt butt kicks         Weight shifts         HR/TR   HR off step 2x10               Mechanical assessment         Modalities                                HEP:   Access Code: [email or web address]  URL: [email or web address]  Date: 04/24/2025  Prepared by: Catalina Faustin    Exercises  - Supine Quad Set  - 1 x daily - 7 x weekly - 1 sets - 10 reps - 5 sec hold  - Supine Heel Slide with Strap  - 1 x daily - 7 x weekly - 1 sets - 10 reps  - Seated Long Arc Quad  - 1 x daily - 7 x weekly - 1 sets - 10 reps  - Long Sitting Calf Stretch with Strap  - 1 x daily - 7 x weekly - 5 reps - 10 sec hold  - Standing Hip Flexor Stretch  - 1 x daily - 7 x weekly - 5 reps - 10 sec hold           "

## 2025-05-23 ENCOUNTER — OFFICE VISIT (OUTPATIENT)
Dept: PHYSICAL THERAPY | Facility: CLINIC | Age: 60
End: 2025-05-23
Payer: COMMERCIAL

## 2025-05-23 DIAGNOSIS — M17.11 PRIMARY OSTEOARTHRITIS OF RIGHT KNEE: ICD-10-CM

## 2025-05-23 DIAGNOSIS — Z96.651 STATUS POST RIGHT KNEE REPLACEMENT: Primary | ICD-10-CM

## 2025-05-23 DIAGNOSIS — M25.561 CHRONIC PAIN OF RIGHT KNEE: ICD-10-CM

## 2025-05-23 DIAGNOSIS — G89.29 CHRONIC PAIN OF RIGHT KNEE: ICD-10-CM

## 2025-05-23 PROCEDURE — 97530 THERAPEUTIC ACTIVITIES: CPT

## 2025-05-23 PROCEDURE — 97110 THERAPEUTIC EXERCISES: CPT

## 2025-05-23 NOTE — PROGRESS NOTES
"Daily Note     Today's date: 2025  Patient name: Keesha Griffith  : 1965  MRN: [id number]  Referring provider: Kamlesh Richter DO  Dx:   Encounter Diagnosis     ICD-10-CM    1. Status post right knee replacement  Z96.651       2. Chronic pain of right knee  M25.561     G89.29       3. Primary osteoarthritis of right knee  M17.11                        Subjective: patient reports her calf is feeling better today. She has some tightness at the joint line but less swelling above or below.       Objective: See treatment diary below      Assessment: Tolerated treatment well. Introduced STS and elevated heel props, both were tolerated well. Educated patient on returning to driving, starting with shorter distances with a passenger. Patient would benefit from continued PT      Plan: Continue per plan of care.      Precautions:   Past Medical History:   Diagnosis Date    Disease of thyroid gland     GERD (gastroesophageal reflux disease)     Hyperlipidemia      Past Surgical History:   Procedure Laterality Date    BREAST SURGERY Left     marker    CHOLECYSTECTOMY      lap    [place] ARTHRP KNE CONDYLE&PLATU MEDIAL&LAT COMPARTMENTS Right 2025    Procedure: ARTHROPLASTY KNEE TOTAL W ROBOT - RIGHT - PRESS FIT - SAME DAY;  Surgeon: Kamlesh Richter DO;  Location: [facility];  Service: Orthopedics    WISDOM TOOTH EXTRACTION       DOS: 25  SOC: 4/10/2025  FOTO: 2025  POC Expiration: 2025  Daily Treatment Log:  Date 2025    Visit # 11 (RE/FOTO)  12 13 14    Auth         Auth exp        Manual 10'  10'     Knee ROM  See obj sectio n       Skin inspection         Stick rolling to R lateral calf  EG RB  EG -post and lateral calf      PROM        Mobs        Distraction with belt seated         There Exer 30' 30'  30' 30'    Recumb Bike for ROM  5' min half moons to full rev   5' min half moons to full rev  5' full rev  5' full     Quad set     TKE 5\" x10     Heel slides  20\" x5 " "20\"x5  20\" x5  20\"x5    Bridges   Bridges 5\" 2x10  5\" 2x10  5\" 2x10    Supine gastroc stretch w/ sos  30\"x3 after stick rolling  Slant board 20\"x3     Soleous stretch on slant board 20\"x3  10\" x5 R      Slant board    20\" x3 gastroc B/L     20\" x3 soleous uni   20\" x3 gastroc B/L     20\" x3 soleous uni      LAQ   W/ add 3\"x10  W/ add 3\" x10  W/ 3\"x10    Prone quad stretch w/ sos  10\" x5 R  10\"x5 R  10\"x5 R  10\"x5    Prone AROM flex   1x10  1x10  1x10    Supine hip flexor stretch         Seated HS stretch  Supine w/ sos 15\"x4   Supine w/ sos 15\"x4  Supine w/ sos 15\"x4  Supine w/ SOS 20\"x5    Heel prop on chair for knee ext     1' x1    Supine SL leg press   20# 2x10 R  20# 2x10 R  20# 2x10 R     Pt edu        HEP        Objective measures  EG       There Activ    10'    STS    2x10 from mat    FSU   4\" 1x10 R  4\" 1x10 R  4\" 1x10 R    LSU   4\" 1x10 R   4\" 1x10 R    Ambulation w/ SPC                         NMReed  10'       Hooklying clamshell         Alt butt kicks         HR/TR   HR off step 2x10   HR off step 2x10            Mechanical assessment         Modalities                                HEP:   Access Code: [email or web address]  URL: [email or web address]  Date: 04/24/2025  Prepared by: Catalina Faustin    Exercises  - Supine Quad Set  - 1 x daily - 7 x weekly - 1 sets - 10 reps - 5 sec hold  - Supine Heel Slide with Strap  - 1 x daily - 7 x weekly - 1 sets - 10 reps  - Seated Long Arc Quad  - 1 x daily - 7 x weekly - 1 sets - 10 reps  - Long Sitting Calf Stretch with Strap  - 1 x daily - 7 x weekly - 5 reps - 10 sec hold  - Standing Hip Flexor Stretch  - 1 x daily - 7 x weekly - 5 reps - 10 sec hold           "

## 2025-05-27 ENCOUNTER — OFFICE VISIT (OUTPATIENT)
Dept: PHYSICAL THERAPY | Facility: CLINIC | Age: 60
End: 2025-05-27
Attending: ORTHOPAEDIC SURGERY
Payer: COMMERCIAL

## 2025-05-27 DIAGNOSIS — G89.29 CHRONIC PAIN OF RIGHT KNEE: ICD-10-CM

## 2025-05-27 DIAGNOSIS — M17.11 PRIMARY OSTEOARTHRITIS OF RIGHT KNEE: ICD-10-CM

## 2025-05-27 DIAGNOSIS — Z96.651 STATUS POST RIGHT KNEE REPLACEMENT: Primary | ICD-10-CM

## 2025-05-27 DIAGNOSIS — M25.561 CHRONIC PAIN OF RIGHT KNEE: ICD-10-CM

## 2025-05-27 PROCEDURE — 97530 THERAPEUTIC ACTIVITIES: CPT

## 2025-05-27 PROCEDURE — 97110 THERAPEUTIC EXERCISES: CPT

## 2025-05-27 NOTE — PROGRESS NOTES
Daily Note     Today's date: 2025  Patient name: Keesha Griffith  : 1965  MRN: [id number]  Referring provider: Kamlesh Richter DO  Dx:   Encounter Diagnosis     ICD-10-CM    1. Status post right knee replacement  Z96.651       2. Chronic pain of right knee  M25.561     G89.29       3. Primary osteoarthritis of right knee  M17.11                      Subjective: pt reports that overall her knee is doing well, she has been not using her cane unless she knows she is going to be walking on uneven surfaces. She feels her knee is getting more mobility overall, she still does struggle w/ stair navigation. Reports improvement in the lower leg pain she was getting.       Objective: See treatment diary below      Assessment: Tolerated treatment well. Pt does cont w/ an antalgic gait but does not report increased pain during amb, mostly due to lack of full R knee ext and habitual limping, pt edu to be more aware of her gait mechanics while walking at home to improve form.  Patient would benefit from continued PT to address continued ROM deficits and improve stair navigation.       Plan: Continue per plan of care.      Precautions:   Past Medical History:   Diagnosis Date    Disease of thyroid gland     GERD (gastroesophageal reflux disease)     Hyperlipidemia      Past Surgical History:   Procedure Laterality Date    BREAST SURGERY Left     marker    CHOLECYSTECTOMY      lap    UT ARTHRP KNE CONDYLE&PLATU MEDIAL&LAT COMPARTMENTS Right 2025    Procedure: ARTHROPLASTY KNEE TOTAL W ROBOT - RIGHT - PRESS FIT - SAME DAY;  Surgeon: Kamlesh Richter DO;  Location: [facility];  Service: Orthopedics    WISDOM TOOTH EXTRACTION       DOS: 25  SOC: 4/10/2025  FOTO: 2025  POC Expiration: 2025  Daily Treatment Log:  Date 2025   Visit # 11 (RE/FOTO)  12 13 14 15    Auth         Auth exp        Manual 10'  10'     Knee ROM  See obj sectio n       Skin inspection        "  Stick rolling to R lateral calf  EG RB  EG -post and lateral calf      PROM        Mobs        Distraction with belt seated         There Exer 30' 30'  30' 30'    Recumb Bike for ROM  5' min half moons to full rev   5' min half moons to full rev  5' full rev  5' full  5' full    Quad set     TKE 5\" x10  TKE RTB 5\"x15    Heel slides  20\" x5 20\"x5  20\" x5  20\"x5 20\"x5    Bridges   Bridges 5\" 2x10  5\" 2x10  5\" 2x10 5\"x10    Supine gastroc stretch w/ sos  30\"x3 after stick rolling  Slant board 20\"x3     Soleous stretch on slant board 20\"x3  10\" x5 R   20\"x4   Slant board    20\" x3 gastroc B/L     20\" x3 soleous uni   20\" x3 gastroc B/L     20\" x3 soleous uni      LAQ   W/ add 3\"x10  W/ add 3\" x10  W/ 3\"x10    Prone quad stretch w/ sos  10\" x5 R  10\"x5 R  10\"x5 R  10\"x5    Prone AROM flex   1x10  1x10  1x10    Supine hip flexor stretch         Seated HS stretch  Supine w/ sos 15\"x4   Supine w/ sos 15\"x4  Supine w/ sos 15\"x4  Supine w/ SOS 20\"x5 Seated w/ self OP 20\"x5    Heel prop on chair for knee ext     1' x1 W/ 5# CW on knee 30\"x4    Supine SL leg press   20# 2x10 R  20# 2x10 R  20# 2x10 R  20# 2x10 R    Pt edu        HEP     Updated & issued    Objective measures  EG       There Activ    10'    STS    2x10 from mat    FSU   4\" 1x10 R  4\" 1x10 R  4\" 1x10 R W/ knee hike for TKE 6\" 1x10 R; 2x    LSU   4\" 1x10 R   4\" 1x10 R    Ambulation w/ SPC                         NMReed  10'       Hooklying clamshell         Alt butt kicks         WB AP/ML      20x    HR/TR   HR off step 2x10   HR off step 2x10            Mechanical assessment         Modalities                                HEP:   Access Code: [email or web address]  URL: [email or web address]  Date: 05/27/2025  Prepared by: Addie Monroe    Exercises  - Supine Heel Slide with Strap  - 1 x daily - 7 x weekly - 1 sets - 10 reps  - Supine Bridge  - 1 x daily - 7 x weekly - 2 sets - 10 reps  - Long Sitting Calf Stretch with Strap  - 1 x daily - 7 x weekly - 5 " reps - 10 sec hold  - Standing Terminal Knee Extension at Wall with Ball  - 1 x daily - 7 x weekly - 3 sets - 10 reps  - Seated Hamstring Stretch with Chair  - 1 x daily - 7 x weekly - 5 reps - 30sec hold  - Seated Hamstring Stretch  - 1 x daily - 7 x weekly - 5 reps - 20sec hold  - Runner's Step Up/Down  - 1 x daily - 7 x weekly - 2 sets - 10 reps

## 2025-05-29 ENCOUNTER — OFFICE VISIT (OUTPATIENT)
Dept: PHYSICAL THERAPY | Facility: CLINIC | Age: 60
End: 2025-05-29
Payer: COMMERCIAL

## 2025-05-29 DIAGNOSIS — Z96.651 STATUS POST RIGHT KNEE REPLACEMENT: Primary | ICD-10-CM

## 2025-05-29 DIAGNOSIS — G89.29 CHRONIC PAIN OF RIGHT KNEE: ICD-10-CM

## 2025-05-29 DIAGNOSIS — M25.561 CHRONIC PAIN OF RIGHT KNEE: ICD-10-CM

## 2025-05-29 PROCEDURE — 97110 THERAPEUTIC EXERCISES: CPT

## 2025-05-29 PROCEDURE — 97530 THERAPEUTIC ACTIVITIES: CPT

## 2025-05-29 NOTE — PROGRESS NOTES
"Daily Note     Today's date: 2025  Patient name: Keesha Griffith  : 1965  MRN: [id number]  Referring provider: Kamlesh Richter DO  Dx:   Encounter Diagnosis     ICD-10-CM    1. Status post right knee replacement  Z96.651       2. Chronic pain of right knee  M25.561     G89.29                      Subjective: patient reports she has not been using the SPC as much and the knee feels ok, she is still limited in walking due to lack of TKE but feels this is improving.       Objective: See treatment diary below      Assessment: Tolerated treatment well. Discussed proper form with ambulation with emphasis on heel toe and TKE as able. Patient verbalized understanding and demo some improvement in gait while exiting clinic. Patient would benefit from continued PT to address continued ROM deficits and improve stair navigation.       Plan: Continue per plan of care.      Precautions:   Past Medical History:   Diagnosis Date    Disease of thyroid gland     GERD (gastroesophageal reflux disease)     Hyperlipidemia      Past Surgical History:   Procedure Laterality Date    BREAST SURGERY Left     marker    CHOLECYSTECTOMY      lap    AL ARTHRP KNE CONDYLE&PLATU MEDIAL&LAT COMPARTMENTS Right 2025    Procedure: ARTHROPLASTY KNEE TOTAL W ROBOT - RIGHT - PRESS FIT - SAME DAY;  Surgeon: Kamlesh Richter DO;  Location: [facility];  Service: Orthopedics    WISDOM TOOTH EXTRACTION       DOS: 25  SOC: 4/10/2025  FOTO: 2025  POC Expiration: 2025  Daily Treatment Log:  Date   2025   Visit #   13 14 15    Auth         Auth exp        Manual   10'     Knee ROM  Supine flex: 113    Supine ext: -5 w/ std TKE       Skin inspection         Stick rolling to R lateral calf    EG -post and lateral calf      PROM        Mobs        Distraction with belt seated         There Exer 30'  30' 30'    Recumb Bike for ROM  5' full rev   5' full rev  5' full  5' full    TKE Std vs RTB  5\" x10    TKE 5\" x10  TKE " "RTB 5\"x15    Heel slides  20\" x5   20\" x5  20\"x5 20\"x5    Bridges  5\" x10   5\" 2x10  5\" 2x10 5\"x10    Supine gastroc stretch w/ sos  20\" x4   10\" x5 R   20\"x4   Slant board    20\" x3 gastroc B/L     20\" x3 soleous uni   20\" x3 gastroc B/L     20\" x3 soleous uni      LAQ  1# 3\" x10 w/ add   W/ add 3\" x10  W/ 3\"x10    Prone quad stretch w/ sos    10\"x5 R  10\"x5    Prone AROM flex    1x10  1x10    Supine hip flexor stretch         Seated HS stretch  Seated w/ self OP 20\"x5   Supine w/ sos 15\"x4  Supine w/ SOS 20\"x5 Seated w/ self OP 20\"x5    Heel prop on chair for knee ext  W/ 5# CW on knee 30\"x4    1' x1 W/ 5# CW on knee 30\"x4    Supine SL leg press  20# 2x10 R   20# 2x10 R  20# 2x10 R  20# 2x10 R    Pt edu        HEP     Updated & issued    Objective measures         There Activ 10'   10'    STS    2x10 from mat    FSU  W/ knee hike for TKE 6\" 1x10 R; 2x   4\" 1x10 R  4\" 1x10 R W/ knee hike for TKE 6\" 1x10 R; 2x    LSU     4\" 1x10 R    Ambulation w/ SPC                         NMReed        Hooklying clamshell         Alt butt kicks         WB AP/ML  20x ea     20x    HR/TR  HR off step 1x10; 2x   HR off step 2x10            Mechanical assessment         Modalities                                HEP:   Access Code: [email or web address]  URL: [email or web address]  Date: 05/27/2025  Prepared by: Addie Monroe    Exercises  - Supine Heel Slide with Strap  - 1 x daily - 7 x weekly - 1 sets - 10 reps  - Supine Bridge  - 1 x daily - 7 x weekly - 2 sets - 10 reps  - Long Sitting Calf Stretch with Strap  - 1 x daily - 7 x weekly - 5 reps - 10 sec hold  - Standing Terminal Knee Extension at Wall with Ball  - 1 x daily - 7 x weekly - 3 sets - 10 reps  - Seated Hamstring Stretch with Chair  - 1 x daily - 7 x weekly - 5 reps - 30sec hold  - Seated Hamstring Stretch  - 1 x daily - 7 x weekly - 5 reps - 20sec hold  - Runner's Step Up/Down  - 1 x daily - 7 x weekly - 2 sets - 10 reps             "

## 2025-06-02 ENCOUNTER — OFFICE VISIT (OUTPATIENT)
Dept: PHYSICAL THERAPY | Facility: CLINIC | Age: 60
End: 2025-06-02
Payer: COMMERCIAL

## 2025-06-02 DIAGNOSIS — M25.561 CHRONIC PAIN OF RIGHT KNEE: ICD-10-CM

## 2025-06-02 DIAGNOSIS — G89.29 CHRONIC PAIN OF RIGHT KNEE: ICD-10-CM

## 2025-06-02 DIAGNOSIS — Z96.651 STATUS POST RIGHT KNEE REPLACEMENT: Primary | ICD-10-CM

## 2025-06-02 DIAGNOSIS — M17.11 PRIMARY OSTEOARTHRITIS OF RIGHT KNEE: ICD-10-CM

## 2025-06-02 PROCEDURE — 97112 NEUROMUSCULAR REEDUCATION: CPT

## 2025-06-02 PROCEDURE — 97110 THERAPEUTIC EXERCISES: CPT

## 2025-06-02 NOTE — PROGRESS NOTES
Daily Note     Today's date: 2025  Patient name: Keesha Griffith  : 1965  MRN: [id number]  Referring provider: Kamlesh Richter DO  Dx:   Encounter Diagnosis     ICD-10-CM    1. Status post right knee replacement  Z96.651       2. Chronic pain of right knee  M25.561     G89.29       3. Primary osteoarthritis of right knee  M17.11                      Subjective: patient reports she is sore this morning. PT explained this can be normal as she continues to use the SPC less. Discussed how swelling can be normal for up to 6 months to a year.       Objective: See treatment diary below      Assessment: Tolerated treatment well with increased resistance on leg press. Patient stated she see's MD Wednesday and if he clears her to return to work that will be her last PT appt. Patient would benefit from continued PT to address continued ROM deficits and improve stair navigation.       Plan: Continue per plan of care.      Precautions:   Past Medical History:   Diagnosis Date    Disease of thyroid gland     GERD (gastroesophageal reflux disease)     Hyperlipidemia      Past Surgical History:   Procedure Laterality Date    BREAST SURGERY Left     marker    CHOLECYSTECTOMY      lap    [place] ARTHRP KNE CONDYLE&PLATU MEDIAL&LAT COMPARTMENTS Right 2025    Procedure: ARTHROPLASTY KNEE TOTAL W ROBOT - RIGHT - PRESS FIT - SAME DAY;  Surgeon: Kamlesh Richter DO;  Location: [facility];  Service: Orthopedics    WISDOM TOOTH EXTRACTION       DOS: 25  SOC: 4/10/2025  FOTO: 2025  POC Expiration: 2025  Daily Treatment Log:  Date 2025   Visit # 16 17   15    Auth         Auth exp        Manual        Knee ROM  Supine flex: 113    Supine ext: -5 w/ std TKE Supine flex: 120        Skin inspection         Stick rolling to R lateral calf         PROM        Mobs        Distraction with belt seated         There Exer 30' 25'      Recumb Bike for ROM  5' full rev  5' full rev    5' full    TKE Std vs  "RTB  5\" x10  Std vs RTB  5\" x10    TKE RTB 5\"x15    Heel slides  20\" x5  20\" x5 R    20\"x5    Bridges  5\" x10  5\" 1x10; 2x    5\"x10    Supine gastroc stretch w/ sos  20\" x4  20\" x5 R    20\"x4   LAQ  1# 3\" x10 w/ add  1# 3\" x10 w/ add       Prone quad stretch w/ sos         Prone AROM flex         Seated HS stretch  Seated w/ self OP 20\"x5  Seated w/ self OP 20\"x5    Seated w/ self OP 20\"x5    Heel prop on chair for knee ext  W/ 5# CW on knee 30\"x4  W/ 5# CW on knee 30\"x4    W/ 5# CW on knee 30\"x4    Supine SL leg press  20# 2x10 R  25# 2x10 R    20# 2x10 R    Pt edu        HEP     Updated & issued    Objective measures         There Activ 10' 5'      STS        FSU  W/ knee hike for TKE 6\" 1x10 R; 2x  W/ knee hike for TKE 6\" 1x10 R; 2x    W/ knee hike for TKE 6\" 1x10 R; 2x    LSU         Ambulation w/ SPC                         NMReed  10'      Hooklying clamshell   RTB uni stab 1x10 R/L       Alt butt kicks         WB AP/ML  20x ea     20x    HR/TR  HR off step 1x10; 2x HR off step 1x10; 2x              Mechanical assessment         Modalities                                HEP:   Access Code: [email or web address]  URL: [email or web address]  Date: 05/27/2025  Prepared by: Addie Monroe    Exercises  - Supine Heel Slide with Strap  - 1 x daily - 7 x weekly - 1 sets - 10 reps  - Supine Bridge  - 1 x daily - 7 x weekly - 2 sets - 10 reps  - Long Sitting Calf Stretch with Strap  - 1 x daily - 7 x weekly - 5 reps - 10 sec hold  - Standing Terminal Knee Extension at Wall with Ball  - 1 x daily - 7 x weekly - 3 sets - 10 reps  - Seated Hamstring Stretch with Chair  - 1 x daily - 7 x weekly - 5 reps - 30sec hold  - Seated Hamstring Stretch  - 1 x daily - 7 x weekly - 5 reps - 20sec hold  - Runner's Step Up/Down  - 1 x daily - 7 x weekly - 2 sets - 10 reps             "

## 2025-06-04 ENCOUNTER — EVALUATION (OUTPATIENT)
Dept: PHYSICAL THERAPY | Facility: CLINIC | Age: 60
End: 2025-06-04
Attending: ORTHOPAEDIC SURGERY
Payer: COMMERCIAL

## 2025-06-04 ENCOUNTER — OFFICE VISIT (OUTPATIENT)
Dept: OBGYN CLINIC | Facility: CLINIC | Age: 60
End: 2025-06-04

## 2025-06-04 VITALS — BODY MASS INDEX: 32.43 KG/M2 | WEIGHT: 183 LBS | HEIGHT: 63 IN

## 2025-06-04 DIAGNOSIS — M25.561 CHRONIC PAIN OF RIGHT KNEE: ICD-10-CM

## 2025-06-04 DIAGNOSIS — Z96.651 AFTERCARE FOLLOWING RIGHT KNEE JOINT REPLACEMENT SURGERY: ICD-10-CM

## 2025-06-04 DIAGNOSIS — G89.29 CHRONIC PAIN OF RIGHT KNEE: ICD-10-CM

## 2025-06-04 DIAGNOSIS — Z96.651 S/P TOTAL KNEE REPLACEMENT NOT USING CEMENT, RIGHT: Primary | ICD-10-CM

## 2025-06-04 DIAGNOSIS — Z96.651 STATUS POST RIGHT KNEE REPLACEMENT: Primary | ICD-10-CM

## 2025-06-04 DIAGNOSIS — M17.11 PRIMARY OSTEOARTHRITIS OF RIGHT KNEE: ICD-10-CM

## 2025-06-04 DIAGNOSIS — Z47.1 AFTERCARE FOLLOWING RIGHT KNEE JOINT REPLACEMENT SURGERY: ICD-10-CM

## 2025-06-04 PROCEDURE — 99024 POSTOP FOLLOW-UP VISIT: CPT | Performed by: ORTHOPAEDIC SURGERY

## 2025-06-04 PROCEDURE — 97110 THERAPEUTIC EXERCISES: CPT

## 2025-06-04 NOTE — HOME EXERCISE EDUCATION
Program_ID:[id number]   Access Code: [email or web address]  URL: [email or web address]  Date: 06-  Prepared By: Catalina Faustin    Program Notes      Exercises      - Supine Heel Slide with Strap - 1 x daily - 7 x weekly - 1 sets - 10 reps      - Supine Bridge - 1 x daily - 7 x weekly - 2 sets - 10 reps      - Long Sitting Calf Stretch with Strap - 1 x daily - 7 x weekly -  sets - 5 reps - 10 sec hold      - Standing Terminal Knee Extension at Wall with Ball - 1 x daily - 7 x weekly - 3 sets - 10 reps      - Seated Hamstring Stretch with Chair - 1 x daily - 7 x weekly -  sets - 5 reps - 30sec hold      - Seated Hamstring Stretch - 1 x daily - 7 x weekly -  sets - 5 reps - 20sec hold      - Runner&#39;s Step Up/Down - 1 x daily - 7 x weekly - 2 sets - 10 reps      - Prone Quadriceps Stretch with Strap - 1 x daily - 7 x weekly -  sets - 3 reps - 20 sec hold      - Seated Long Arc Quad - 1 x daily - 7 x weekly - 2 sets - 10 reps

## 2025-06-04 NOTE — LETTER
June 4, 2025     Patient: Keesha Griffith  YOB: 1965  Date of Visit: 6/4/2025      To Whom it May Concern:    Keesha Griffith is under my professional care. Keesha was seen in my office on 6/4/2025. Keesha may return to work on 6/16/2025 for full duty no restrictions.    If you have any questions or concerns, please don't hesitate to call.         Sincerely,          Kamlesh Richter, DO        CC: No Recipients

## 2025-06-04 NOTE — PROGRESS NOTES
PT post op Re-evaluation     Today's date: 2025  Patient name: Keesha Griffith  : 1965  MRN: [id number]  Referring provider: Kamlesh Richter DO  Dx:   Encounter Diagnosis     ICD-10-CM    1. Status post right knee replacement  Z96.651       2. Chronic pain of right knee  M25.561     G89.29       3. Primary osteoarthritis of right knee  M17.11                            Assessment  Impairments: abnormal gait, abnormal or restricted ROM and impaired physical strength    Assessment details: Keesha Griffith is a 60 y.o. female who presents for post op re-eval for R TKE performed on 2025. Patient presents with improvements in RLE strength,  R knee ROM, and ambulation tolerance with no AD, however deficits are still present. Due to these impairments, patient has limitations in some ADLS, ambulation distance tolerated, stair negotiation,  Patient's clinical presentation is consistent with their referring diagnosis of  S/P R TKA, Primary osteoarthritis of right knee and Chronic pain of right knee. Patient has been educated in gait mechanics, home exercise program, and plan of care. Patient would benefit from skilled physical therapy services to address their aforementioned functional limitations and progress towards prior level of function and independence with home exercise program.         Goals  Knee   Short Term Goals:  Target Date 4 weeks   STG1. Initiate and advance HEP to maximize progress between therapy sessions---met  STG2. Pt will demo AROM  B/L knee to 0-120 degrees to allow pt to transfer in/out of the car w/o difficulty ---met  STG3. Improve B/L LE strength to 4/5 to allow pt to raise from sit to stand w/o UE assist.--met  STG4. Reduce pain w/ WB activity to 0-4/10 to improve functional independence. --met    Long Term Goals:  Target Date 12 weeks  LTG1. Pt to be Indep with HEP  to maximize progress between therapy sessions and improve functional mobility --progressing towards   LTG2. Pt will demo knee  ROM of 0-130 or greater needed for reciprocal stairs w/ handrail w/o pain and to don shoes/socks independently. --progressing towards   LTG3. Pt to tolerate prolonged walking on uneven terrain w/o asst device. --progressing towards   LTG4. Pt to demo LE strength of 4+/5 or better such that pt can perform reciprocal stair negotiation --progressing towards   LTG5: Pt will improve standing tolerance to 30 min or more as per PLOF to return to work related tasks--met    Plan  Patient would benefit from: skilled physical therapy  Planned modality interventions: cryotherapy, thermotherapy: hydrocollator packs and unattended electrical stimulation    Planned therapy interventions: balance/weight bearing training, joint mobilization, neuromuscular re-education, patient education, self care, therapeutic activities, therapeutic exercise, functional ROM exercises, home exercise program, IASTM, manual therapy, abdominal trunk stabilization, patient/caregiver education and postural training    Frequency: 2-3x week  Plan of Care beginning date: 4/10/2025  Plan of Care expiration date: 7/17/2025  Treatment plan discussed with: patient  Plan details: HEP development, stretching, strengthening, A/AA/PROM, joint mobilizations, posture education, STM/MI as needed to reduce muscle tension, muscle reeducation, PLOC discussed and agreed upon with patient.         Subjective Evaluation    History of Present Illness  Mechanism of injury: Patient reports to PT for post op TKA evaluation performed on 4/21/2025. Patient reports she has been feeling better and may be returning to work soon. Patient has been conscious of her mechanics with walking to get more of a natural heel toe and full knee ext in stance.   Patient Goals  Patient goals for therapy: decreased edema, decreased pain, improved balance, increased motion, independence with ADLs/IADLs, increased strength, return to sport/leisure activities and return to work    Pain  Current pain  "ratin  At best pain ratin  At worst pain ratin  Location: R knee  Quality: twinge.  Progression: improved    Social Support  Steps to enter house: yes  12  Stairs in house: yes   12  Lives in: multiple-level home  Lives with: parents (daughter will be around often to help)    Employment status: working ()    Diagnostic Tests  Abnormal x-ray: Severe bilateral knee osteoarthritis predominantly in the medial compartments..        Objective        Knee AROM: deg      R  L  R  R     R    Date:    4/10  4/10  4/24  5/16    6/4  Flexion: (sup)    120  125  90  110A/ 115AA      122(sup)/ 100(prone)   Extension: (sup)  -10  0  -10  -5/ QS     -3 w/ QS       Strength: MMT  Hip    R  L  R  R  R   Date:     4/10  4/10  4/24  5/16  64   Flexion(sup)   5/5  5/5  3-/5  3/5  4/5(QL)    Abduction(S/L)  5/5  5/5  NT  3/5  4+/5     Knee    R  L  R  R  R  Date:     4/10  4/10  4/24  5/16  64  Extension(Seated)  4/5  5/5  3/5  4-/5   4/5   Flexion(Seated)  4/5  5/5  4-/5  4/5   4+/5     Ankle    R  L  R  R  R  Date:     4/10  4/10  4/24  5/16  6/4  Dorsiflexion(seated)  5/5  5/5  5/5  5/5  5/5  Plantarflexion(seated)  5/5  5/5  5/5  5/5  5/5        Tenderness/Palpation: no TTP       Function  2025  Gait: patient denies pain however continues with antalgic pattern, most likely habitual- discussed proper gait patter with improvements noted over the last few sessions. Patient no longer using any AD with ambulation.   Transfers: able to perform supine to/from sit and car transfers with no assistance   Stairs: patient demo improved control with 6\" step with ascent and descent however is limited on 8\" step.   Curb: patient demo good form with no AD     2025:  Gait: antalgic gait pattern with use of SPC, limited knee flexion with swing phase and lack of TKE in stance on R.   Transfers: able to perform supine to/from sit and car transfers with no assistance   Stairs: patient demo good form with \"up with " "the good and down with the bad\" with stair negotiation w/ SPC and rail. Able to ascend and descend 4\" step with decreased control w/ uni rail and SPC.   Curb: patient demo good understanding of \"up with the good and down with the bad\" with stair negotiation w/ SPC     2025:   Gait: mild antalgic gait pattern with use of RW   Transfers: able to perform supine to/from sit and car transfers with no assistance   Stairs: patient demo good form with \"up with the good and down with the bad\" with stair negotiation w/ SPC and rail   Curb: patient demo good understanding of \"up with the good and down with the bad\" with stair negotiation w/ RW     4/10/2025  Gait: mild antalgic gait pattern with with limited TKE in terminal stance and decreased stance time on RLE   Transfers: able to perform supine to/from sit and car transfers with no assistance   Stairs: patient demo good understanding of \"up with the good and down with the bad\" with stair negotiation w/ RW And SPC   Curb: patient demo good understanding of \"up with the good and down with the bad\" with stair negotiation w/ RW And SPC     TU2025: 9.74 sec no AD   Post op 2025: 34.06 sec w/ RW and B/L UE push off     Homens:  25: neg on R   2025: neg on R          Precautions:   Past Medical History:   Diagnosis Date    Disease of thyroid gland     GERD (gastroesophageal reflux disease)     Hyperlipidemia      Past Surgical History:   Procedure Laterality Date    BREAST SURGERY Left     marker    CHOLECYSTECTOMY      lap    [place] ARTHRP KNE CONDYLE&PLATU MEDIAL&LAT COMPARTMENTS Right 2025    Procedure: ARTHROPLASTY KNEE TOTAL W ROBOT - RIGHT - PRESS FIT - SAME DAY;  Surgeon: Kamlesh Richter DO;  Location: [facility];  Service: Orthopedics    WISDOM TOOTH EXTRACTION       DOS: 25  SOC: 4/10/2025  FOTO: 2025  POC Expiration: 2025  Daily Treatment Log:  Date 2025     Visit # 16 17 18 (RE/FOTO)     Auth         Auth " "exp        Manual        Knee ROM  Supine flex: 113    Supine ext: -5 w/ std TKE Supine flex: 120   Sup flex: 122     Skin inspection         Stick rolling to R lateral calf         PROM        Mobs        Distraction with belt seated         There Exer 30' 25' 35'     Recumb Bike for ROM  5' full rev  5' full rev  5' full rev      TKE Std vs RTB  5\" x10  Std vs RTB  5\" x10       Heel slides  20\" x5  20\" x5 R  20\" x5 R     Bridges  5\" x10  5\" 1x10; 2x       Supine gastroc stretch w/ sos  20\" x4  20\" x5 R  20\" x5 R      LAQ  1# 3\" x10 w/ add  1# 3\" x10 w/ add  1# 3\" x10 w/ add      Prone quad stretch w/ sos    20\" x3 R      Prone AROM flex         Seated HS stretch  Seated w/ self OP 20\"x5  Seated w/ self OP 20\"x5  Seated w/ self OP 20\"x5      Heel prop on chair for knee ext  W/ 5# CW on knee 30\"x4  W/ 5# CW on knee 30\"x4       Supine SL leg press  20# 2x10 R  25# 2x10 R  25# 2x10 R      Pt edu        HEP        Objective measures    EG     There Activ 10' 5' 5'     STS        FSU  W/ knee hike for TKE 6\" 1x10 R; 2x  W/ knee hike for TKE 6\" 1x10 R; 2x  W/ knee hike for TKE 6\" 1x10 R; 2x      LSU         Ambulation w/ SPC                         NMReed  10'      Hooklying clamshell   RTB uni stab 1x10 R/L       Alt butt kicks         WB AP/ML  20x ea        HR/TR  HR off step 1x10; 2x HR off step 1x10; 2x HR off step 1x10; 2x             Mechanical assessment         Modalities                                HEP:   Access Code: [email or web address]  URL: [email or web address]  Date: 06/04/2025  Prepared by: Catalina Faustin    Exercises  - Supine Heel Slide with Strap  - 1 x daily - 7 x weekly - 1 sets - 10 reps  - Supine Bridge  - 1 x daily - 7 x weekly - 2 sets - 10 reps  - Long Sitting Calf Stretch with Strap  - 1 x daily - 7 x weekly - 5 reps - 10 sec hold  - Prone Quadriceps Stretch with Strap  - 1 x daily - 7 x weekly - 3 reps - 20 sec hold  - Standing Terminal Knee Extension at Wall with Ball  - 1 x daily - 7 x " weekly - 3 sets - 10 reps  - Runner's Step Up/Down  - 1 x daily - 7 x weekly - 2 sets - 10 reps  - Seated Hamstring Stretch with Chair  - 1 x daily - 7 x weekly - 5 reps - 30sec hold  - Seated Hamstring Stretch  - 1 x daily - 7 x weekly - 5 reps - 20sec hold  - Seated Long Arc Quad  - 1 x daily - 7 x weekly - 2 sets - 10 reps

## 2025-06-04 NOTE — HOME EXERCISE EDUCATION
Program_ID:[id number]   Access Code: [email or web address]  URL: [email or web address]  Date: 06-  Prepared By: Catalina Faustin    Program Notes      Exercises      - Supine Heel Slide with Strap - 1 x daily - 7 x weekly - 1 sets - 10 reps      - Supine Bridge - 1 x daily - 7 x weekly - 2 sets - 10 reps      - Long Sitting Calf Stretch with Strap - 1 x daily - 7 x weekly -  sets - 5 reps - 10 sec hold      - Prone Quadriceps Stretch with Strap - 1 x daily - 7 x weekly -  sets - 3 reps - 20 sec hold      - Standing Terminal Knee Extension at Wall with Ball - 1 x daily - 7 x weekly - 3 sets - 10 reps      - Runner&#39;s Step Up/Down - 1 x daily - 7 x weekly - 2 sets - 10 reps      - Seated Hamstring Stretch with Chair - 1 x daily - 7 x weekly -  sets - 5 reps - 30sec hold      - Seated Hamstring Stretch - 1 x daily - 7 x weekly -  sets - 5 reps - 20sec hold      - Seated Long Arc Quad - 1 x daily - 7 x weekly - 2 sets - 10 reps

## 2025-06-04 NOTE — PROGRESS NOTES
Name: Keesha Griffith      : 1965      MRN: [id number]  Encounter Provider: Kamlesh Richter DO  Encounter Date: 2025   Encounter department: [facility]  :  Assessment & Plan  S/P total knee replacement not using cement, right         Aftercare following right knee joint replacement surgery              Keesha Griffith is a pleasant 60 y.o. female who presents for follow-up evaluation 6 weeks status post right total knee arthroplasty performed on 2025.  Upon evaluation patient does demonstrate notable swelling of her knee as well as 5 degrees of decrease in extension, her flexion and strength is adequate at this time.  I did discuss with her she should continue her efforts in physical therapy to focus on extension and gains that she does demonstrate soft endpoints at this time.  I did also discussed with her to keep her foot elevated above the level of her heart when she is resting to help decrease her swelling which I believe is her primary factor limiting her knee extension.  She was amenable to this.  I did discuss with her that she can return to work as a  a note was provided for this.   She will follow-up in 6 weeks for reevaluation with x-rays arrival.  All of her questions and concerns were addressed today.      Return in about 6 weeks (around 2025).  X-rays on arrival    I have personally reviewed pertinent imaging in PACS.  None taken to review    History: Keesha Griffith is a 60 y.o. female who presents today for follow-up evaluation status post 6 weeks right total knee arthroplasty performed on 2025.  Patient states that she is feeling better since last visit, she states she is progressing well in physical therapy.  She will occasionally have swelling at the end of the day however she does feel better and more flexible when she wakes up.  She does complain of having difficulty going upstairs and will fatigue easily.  She denies any recent  "injury or trauma just paresthesias.  She is curious of when she can return to work as a .        Estimated body mass index is 32.94 kg/m² as calculated from the following:    Height as of this encounter: 5' 2.5\" (1.588 m).    Weight as of this encounter: 83 kg (183 lb).    Lab Results   Component Value Date    HGBA1C 5.1 04/07/2025       Social History     Occupational History    Not on file   Tobacco Use    Smoking status: Never     Passive exposure: Past    Smokeless tobacco: Never   Vaping Use    Vaping status: Never Used   Substance and Sexual Activity    Alcohol use: Yes     Comment: occ    Drug use: Never    Sexual activity: Not on file       Objective:  Right Knee Exam     Muscle Strength   The patient has normal right knee strength.    Tenderness   The patient is experiencing no tenderness.     Range of Motion   Right knee extension: 5.   Flexion:  120     Tests   Varus: negative Valgus: negative    Other   Erythema: absent  Scars: present  Sensation: normal  Pulse: present  Swelling: mild  Effusion: effusion present    Comments:   Well-healed anterior surgical scar, no signs of infection warmth erythema  No distal paresthesia  Homans' sign negative  Swelling of knee  Flexion and extension endpoints are soft            Observations     Right Knee   Positive for effusion.       There were no vitals filed for this visit.    Subjective:  Past Medical History[1]    Past Surgical History[2]    Family History[3]    Current Medications[4]    Allergies[5]    Review of Systems   Constitutional:  Positive for activity change. Negative for chills, fever and unexpected weight change.   HENT:  Negative for hearing loss, nosebleeds and sore throat.    Eyes:  Negative for pain, redness and visual disturbance.   Respiratory:  Negative for cough, shortness of breath and wheezing.    Cardiovascular:  Negative for chest pain, palpitations and leg swelling.   Gastrointestinal:  Negative for abdominal pain, nausea " and vomiting.   Endocrine: Negative for polydipsia and polyuria.   Genitourinary:  Negative for dysuria and hematuria.   Musculoskeletal:  See HPI  Skin:  Negative for rash and wound.   Neurological:  Negative for dizziness, numbness and headaches.   Psychiatric/Behavioral:  Negative for decreased concentration and suicidal ideas. The patient is not nervous/anxious.      Physical Exam  Vitals and nursing note reviewed.   Constitutional:       Appearance: Normal appearance. She is well-developed.   HENT:      Head: Normocephalic and atraumatic.      Right Ear: External ear normal.      Left Ear: External ear normal.   Eyes:      General: No scleral icterus.     Extraocular Movements: Extraocular movements intact.      Conjunctiva/sclera: Conjunctivae normal.   Cardiovascular:      Rate and Rhythm: Normal rate.   Pulmonary:      Effort: Pulmonary effort is normal. No respiratory distress.   Musculoskeletal:      Cervical back: Normal range of motion and neck supple.      Comments: See Ortho exam   Skin:     General: Skin is warm and dry.   Neurological:      General: No focal deficit present.      Mental Status: She is alert and oriented to person, place, and time.   Psychiatric:         Behavior: Behavior normal.     Scribe Attestation      I,:  Stephon Roy am acting as a scribe while in the presence of the attending physician.:       I,:  Kamlesh Richter, DO personally performed the services described in this documentation    as scribed in my presence.:           This document was created using speech voice recognition software.   Grammatical errors, random word insertions, pronoun errors, and incomplete sentences are an occasional consequence of this system due to software limitations, ambient noise, and hardware issues.   Any formal questions or concerns about content, text, or information contained within the body of this dictation should be directly addressed to the provider for clarification.        [1]   Past  Medical History:  Diagnosis Date    Disease of thyroid gland     GERD (gastroesophageal reflux disease)     Hyperlipidemia    [2]   Past Surgical History:  Procedure Laterality Date    BREAST SURGERY Left     marker    CHOLECYSTECTOMY      lap    [place] ARTHRP KNE CONDYLE&PLATU MEDIAL&LAT COMPARTMENTS Right 4/21/2025    Procedure: ARTHROPLASTY KNEE TOTAL W ROBOT - RIGHT - PRESS FIT - SAME DAY;  Surgeon: Kamlesh Richter DO;  Location: [facility];  Service: Orthopedics    WISDOM TOOTH EXTRACTION     [3]   Family History  Problem Relation Name Age of Onset    Cancer Father      Hypertension Father     [4]   Current Outpatient Medications:     acetaminophen (TYLENOL) 650 mg CR tablet, Take 1 tablet (650 mg total) by mouth every 8 (eight) hours as needed for mild pain, Disp: 30 tablet, Rfl: 0    Biotin 2.5 MG TABS, Take by mouth, Disp: , Rfl:     levothyroxine 75 mcg tablet, Take 75 mcg by mouth in the morning. Unsure of dose ., Disp: , Rfl:     mupirocin (BACTROBAN) 2 % ointment, Apply topically 2 (two) times a day, Disp: 15 g, Rfl: 0    ondansetron (ZOFRAN) 4 mg tablet, Take 1 tablet (4 mg total) by mouth every 8 (eight) hours as needed for nausea or vomiting, Disp: 20 tablet, Rfl: 0    ondansetron (ZOFRAN-ODT) 4 mg disintegrating tablet, Take 1 tablet (4 mg total) by mouth every 6 (six) hours as needed for nausea or vomiting, Disp: 20 tablet, Rfl: 0    rosuvastatin (CRESTOR) 10 MG tablet, Will start after surgery, Disp: , Rfl:     ascorbic acid (VITAMIN C) 500 MG tablet, Take 1 tablet (500 mg total) by mouth 2 (two) times a day, Disp: 60 tablet, Rfl: 0    aspirin 325 mg tablet, Take 1 tablet (325 mg total) by mouth 2 (two) times a day, Disp: 84 tablet, Rfl: 0    celecoxib (CeleBREX) 100 mg capsule, Take 1 capsule (100 mg total) by mouth 2 (two) times a day for 14 days, Disp: 28 capsule, Rfl: 0    Cholecalciferol (VITAMIN D3) 1,000 units tablet, Take 1 tablet (1,000 Units total) by mouth daily, Disp: 30 tablet, Rfl: 0     docusate sodium (COLACE) 100 mg capsule, Take 1 capsule (100 mg total) by mouth 2 (two) times a day, Disp: 60 capsule, Rfl: 0    ferrous sulfate 324 (65 Fe) mg, Take 1 tablet (324 mg total) by mouth daily before breakfast, Disp: 30 tablet, Rfl: 0    folic acid (FOLVITE) 1 mg tablet, Take 1 tablet (1 mg total) by mouth daily, Disp: 30 tablet, Rfl: 0    oxyCODONE (Roxicodone) 5 immediate release tablet, Take 1 tablet (5 mg total) by mouth every 4 (four) hours as needed for moderate pain Max Daily Amount: 30 mg (Patient not taking: Reported on 6/4/2025), Disp: 40 tablet, Rfl: 0    zinc sulfate (ZINCATE) 220 mg capsule, Take 1 capsule (220 mg total) by mouth daily, Disp: 30 capsule, Rfl: 0  [5] No Known Allergies

## 2025-06-10 ENCOUNTER — OFFICE VISIT (OUTPATIENT)
Dept: PHYSICAL THERAPY | Facility: CLINIC | Age: 60
End: 2025-06-10
Payer: COMMERCIAL

## 2025-06-10 DIAGNOSIS — G89.29 CHRONIC PAIN OF RIGHT KNEE: ICD-10-CM

## 2025-06-10 DIAGNOSIS — M25.561 CHRONIC PAIN OF RIGHT KNEE: ICD-10-CM

## 2025-06-10 DIAGNOSIS — M17.11 PRIMARY OSTEOARTHRITIS OF RIGHT KNEE: ICD-10-CM

## 2025-06-10 DIAGNOSIS — Z96.651 STATUS POST RIGHT KNEE REPLACEMENT: Primary | ICD-10-CM

## 2025-06-10 PROCEDURE — 97530 THERAPEUTIC ACTIVITIES: CPT

## 2025-06-10 PROCEDURE — 97110 THERAPEUTIC EXERCISES: CPT

## 2025-06-10 NOTE — PROGRESS NOTES
Daily Note     Today's date: 6/10/2025  Patient name: Keesha Griffith  : 1965  MRN: [id number]  Referring provider: Kamlesh Richter DO  Dx:   Encounter Diagnosis     ICD-10-CM    1. Status post right knee replacement  Z96.651       2. Chronic pain of right knee  M25.561     G89.29       3. Primary osteoarthritis of right knee  M17.11                      Subjective: pt reports that she is eager to get back to work after finishing PT this week.       Objective: See treatment diary below      Assessment: Tolerated treatment well. Pt re-edu on the importance of cont stretching into both flex/ext ROM w/ her upcoming DC to cont to progress this ROM and allow her improved confidence and form during amb and limit her walking on a bent knee. Patient exhibited good technique with therapeutic exercises      Plan: Continue per plan of care.  Pt to DC to HEP next visit      Precautions:   Past Medical History:   Diagnosis Date    Disease of thyroid gland     GERD (gastroesophageal reflux disease)     Hyperlipidemia      Past Surgical History:   Procedure Laterality Date    BREAST SURGERY Left     marker    CHOLECYSTECTOMY      lap    [place] ARTHRP KNE CONDYLE&PLATU MEDIAL&LAT COMPARTMENTS Right 2025    Procedure: ARTHROPLASTY KNEE TOTAL W ROBOT - RIGHT - PRESS FIT - SAME DAY;  Surgeon: Kamlesh Richter DO;  Location: [facility];  Service: Orthopedics    WISDOM TOOTH EXTRACTION       DOS: 25  SOC: 4/10/2025  FOTO: 2025  POC Expiration: 2025  Daily Treatment Log:  Date 2025 2025 2025 6/10/2025    Visit # 16 17 18 (RE/FOTO) 19     Auth         Auth exp        Manual        Knee ROM  Supine flex: 113    Supine ext: -5 w/ std TKE Supine flex: 120   Sup flex: 122     Skin inspection         Stick rolling to R lateral calf         PROM        Mobs        Distraction with belt seated         There Exer 30' 25' 35' 30'     Recumb Bike for ROM  5' full rev  5' full rev  5' full rev  5' full rev     TKE Std vs  "RTB  5\" x10  Std vs RTB  5\" x10   GTB 5\"x15     Heel slides  20\" x5  20\" x5 R  20\" x5 R     Bridges  5\" x10  5\" 1x10; 2x       Supine gastroc stretch w/ sos  20\" x4  20\" x5 R  20\" x5 R      LAQ  1# 3\" x10 w/ add  1# 3\" x10 w/ add  1# 3\" x10 w/ add  1.5# 3\"x10 w/ add     Prone quad stretch w/ sos    20\" x3 R      Prone AROM flex         Seated HS stretch  Seated w/ self OP 20\"x5  Seated w/ self OP 20\"x5  Seated w/ self OP 20\"x5  Seated w/ self OP 20\"x5      Heel prop on chair for knee ext  W/ 5# CW on knee 30\"x4  W/ 5# CW on knee 30\"x4   W/ 5# CW on knee 30\"x4      Supine SL leg press  20# 2x10 R  25# 2x10 R  25# 2x10 R  25# 2x10 R     Pt edu        HEP        Objective measures    EG     There Activ 10' 5' 5' 10'     STS    Butt taps to elevated mat 1x10; 2x     FSU  W/ knee hike for TKE 6\" 1x10 R; 2x  W/ knee hike for TKE 6\" 1x10 R; 2x  W/ knee hike for TKE 6\" 1x10 R; 2x  W/ knee hike for TKE 6\" 1x10 R; 2x      LSU         Ambulation w/ SPC                         NMReed  10'      Hooklying clamshell   RTB uni stab 1x10 R/L       Alt butt kicks         WB AP/ML  20x ea    20x ea    HR/TR  HR off step 1x10; 2x HR off step 1x10; 2x HR off step 1x10; 2x SL HR on leg press 15# 2x10 R             Mechanical assessment         Modalities                                HEP:   Access Code: [email or web address]  URL: [email or web address]  Date: 06/04/2025  Prepared by: Catalina Faustin    Exercises  - Supine Heel Slide with Strap  - 1 x daily - 7 x weekly - 1 sets - 10 reps  - Supine Bridge  - 1 x daily - 7 x weekly - 2 sets - 10 reps  - Long Sitting Calf Stretch with Strap  - 1 x daily - 7 x weekly - 5 reps - 10 sec hold  - Prone Quadriceps Stretch with Strap  - 1 x daily - 7 x weekly - 3 reps - 20 sec hold  - Standing Terminal Knee Extension at Wall with Ball  - 1 x daily - 7 x weekly - 3 sets - 10 reps  - Runner's Step Up/Down  - 1 x daily - 7 x weekly - 2 sets - 10 reps  - Seated Hamstring Stretch with Chair  - 1 x " daily - 7 x weekly - 5 reps - 30sec hold  - Seated Hamstring Stretch  - 1 x daily - 7 x weekly - 5 reps - 20sec hold  - Seated Long Arc Quad  - 1 x daily - 7 x weekly - 2 sets - 10 reps

## 2025-06-12 ENCOUNTER — OFFICE VISIT (OUTPATIENT)
Dept: PHYSICAL THERAPY | Facility: CLINIC | Age: 60
End: 2025-06-12
Attending: ORTHOPAEDIC SURGERY
Payer: COMMERCIAL

## 2025-06-12 ENCOUNTER — TELEPHONE (OUTPATIENT)
Age: 60
End: 2025-06-12

## 2025-06-12 DIAGNOSIS — E78.2 MIXED HYPERLIPIDEMIA: Primary | ICD-10-CM

## 2025-06-12 DIAGNOSIS — Z96.651 STATUS POST RIGHT KNEE REPLACEMENT: Primary | ICD-10-CM

## 2025-06-12 DIAGNOSIS — M17.11 PRIMARY OSTEOARTHRITIS OF RIGHT KNEE: ICD-10-CM

## 2025-06-12 DIAGNOSIS — G89.29 CHRONIC PAIN OF RIGHT KNEE: ICD-10-CM

## 2025-06-12 DIAGNOSIS — M25.561 CHRONIC PAIN OF RIGHT KNEE: ICD-10-CM

## 2025-06-12 PROCEDURE — 97110 THERAPEUTIC EXERCISES: CPT

## 2025-06-12 PROCEDURE — 97112 NEUROMUSCULAR REEDUCATION: CPT

## 2025-06-12 RX ORDER — ROSUVASTATIN CALCIUM 10 MG/1
10 TABLET, COATED ORAL DAILY
Qty: 90 TABLET | Refills: 1 | Status: SHIPPED | OUTPATIENT
Start: 2025-06-12

## 2025-06-12 NOTE — TELEPHONE ENCOUNTER
Patient is calling in, asking if Maranda will take over ordering the rosuvastatin for her?      She would rather all of her medications be under one doctor.    Please advise.

## 2025-06-12 NOTE — PROGRESS NOTES
Daily Note     Today's date: 2025  Patient name: Keesha Griffith  : 1965  MRN: [id number]  Referring provider: Kamlesh Richter DO  Dx:   Encounter Diagnosis     ICD-10-CM    1. Status post right knee replacement  Z96.651       2. Chronic pain of right knee  M25.561     G89.29       3. Primary osteoarthritis of right knee  M17.11             Start Time: 0930  Stop Time: 1008  Total time in clinic (min): 38 minutes    Subjective: Pt reports that she is going back to work next week and feeling good today.     Objective: See treatment diary below. Treatment and documentation completed by Viviane Toure SPT, under direct supervision and review of documentation completed by Catalina Faustin DPT.    See last re-eval for most recent obj measures and goals     Assessment: Tolerated treatment well. Pt demonstrated good understanding of all exercises today. Discharged pt to HEP . Patient exhibited good technique with therapeutic exercises      Plan: Pt to DC to HEP       Precautions:   Past Medical History:   Diagnosis Date    Disease of thyroid gland     GERD (gastroesophageal reflux disease)     Hyperlipidemia      Past Surgical History:   Procedure Laterality Date    BREAST SURGERY Left     marker    CHOLECYSTECTOMY      lap    [place] ARTHRP KNE CONDYLE&PLATU MEDIAL&LAT COMPARTMENTS Right 2025    Procedure: ARTHROPLASTY KNEE TOTAL W ROBOT - RIGHT - PRESS FIT - SAME DAY;  Surgeon: Kamlesh Richter DO;  [facility]: [facility];  Service: Orthopedics    WISDOM TOOTH EXTRACTION       DOS: 25  SOC: 4/10/2025  FOTO: 2025  POC Expiration: 2025  Daily Treatment Log:  Date 2025 2025 2025 6/10/2025 2025   Visit # 16 17 18 (RE/FOTO) 19  20   Auth         Auth exp        Manual        Knee ROM  Supine flex: 113    Supine ext: -5 w/ std TKE Supine flex: 120   Sup flex: 122     Skin inspection         Stick rolling to R lateral calf         PROM        Mobs        Distraction with belt seated        "  There Exer 30' 25' 35' 30'  30'   Recumb Bike for ROM  5' full rev  5' full rev  5' full rev  5' full rev  5' full rev   TKE Std vs RTB  5\" x10  Std vs RTB  5\" x10   GTB 5\"x15  GTB 5\"x15   Heel slides  20\" x5  20\" x5 R  20\" x5 R     Bridges  5\" x10  5\" 1x10; 2x       Supine gastroc stretch w/ sos  20\" x4  20\" x5 R  20\" x5 R   Slant board 20\"x5   LAQ  1# 3\" x10 w/ add  1# 3\" x10 w/ add  1# 3\" x10 w/ add  1.5# 3\"x10 w/ add  1.5# 3\"x10 w/ add   Prone quad stretch w/ sos    20\" x3 R      Prone AROM flex         Seated HS stretch  Seated w/ self OP 20\"x5  Seated w/ self OP 20\"x5  Seated w/ self OP 20\"x5  Seated w/ self OP 20\"x5   Seated w/ self OP 20\"x5    Heel prop on chair for knee ext  W/ 5# CW on knee 30\"x4  W/ 5# CW on knee 30\"x4   W/ 5# CW on knee 30\"x4   W/ 5# CW on knee 30\"x4    Supine SL leg press  20# 2x10 R  25# 2x10 R  25# 2x10 R  25# 2x10 R  25# 2x10 R   Pt edu        HEP        Objective measures    EG     There Activ 10' 5' 5' 10'  8'   STS    Butt taps to elevated mat 1x10; 2x  Butt taps to elevated mat 1x10; 2x    FSU  W/ knee hike for TKE 6\" 1x10 R; 2x  W/ knee hike for TKE 6\" 1x10 R; 2x  W/ knee hike for TKE 6\" 1x10 R; 2x  W/ knee hike for TKE 6\" 1x10 R; 2x   W/ knee hike for TKE 6\" 1x10 R; 2x    LSU         Ambulation w/ SPC                         NMReed  10'      Hooklying clamshell   RTB uni stab 1x10 R/L       Alt butt kicks         WB AP/ML  20x ea    20x ea 20x ea   HR/TR  HR off step 1x10; 2x HR off step 1x10; 2x HR off step 1x10; 2x SL HR on leg press 15# 2x10 R  SL HR on leg press 15# 2x10 R           Mechanical assessment         Modalities                                HEP:   Access Code: [email or web address]  URL: [email or web address]  Date: 06/04/2025  Prepared by: Catalina Faustin    Exercises  - Supine Heel Slide with Strap  - 1 x daily - 7 x weekly - 1 sets - 10 reps  - Supine Bridge  - 1 x daily - 7 x weekly - 2 sets - 10 reps  - Long Sitting Calf Stretch with Strap  - 1 x daily - " 7 x weekly - 5 reps - 10 sec hold  - Prone Quadriceps Stretch with Strap  - 1 x daily - 7 x weekly - 3 reps - 20 sec hold  - Standing Terminal Knee Extension at Wall with Ball  - 1 x daily - 7 x weekly - 3 sets - 10 reps  - Runner's Step Up/Down  - 1 x daily - 7 x weekly - 2 sets - 10 reps  - Seated Hamstring Stretch with Chair  - 1 x daily - 7 x weekly - 5 reps - 30sec hold  - Seated Hamstring Stretch  - 1 x daily - 7 x weekly - 5 reps - 20sec hold  - Seated Long Arc Quad  - 1 x daily - 7 x weekly - 2 sets - 10 reps

## 2025-07-23 ENCOUNTER — OFFICE VISIT (OUTPATIENT)
Dept: OBGYN CLINIC | Facility: CLINIC | Age: 60
End: 2025-07-23
Payer: COMMERCIAL

## 2025-07-23 ENCOUNTER — APPOINTMENT (OUTPATIENT)
Dept: RADIOLOGY | Facility: CLINIC | Age: 60
End: 2025-07-23
Attending: ORTHOPAEDIC SURGERY
Payer: COMMERCIAL

## 2025-07-23 VITALS — BODY MASS INDEX: 32.87 KG/M2 | WEIGHT: 185.5 LBS | HEIGHT: 63 IN

## 2025-07-23 DIAGNOSIS — Z96.651 S/P TOTAL KNEE REPLACEMENT NOT USING CEMENT, RIGHT: Primary | ICD-10-CM

## 2025-07-23 DIAGNOSIS — Z96.651 S/P TOTAL KNEE REPLACEMENT NOT USING CEMENT, RIGHT: ICD-10-CM

## 2025-07-23 DIAGNOSIS — Z47.1 AFTERCARE FOLLOWING RIGHT KNEE JOINT REPLACEMENT SURGERY: ICD-10-CM

## 2025-07-23 DIAGNOSIS — Z96.651 AFTERCARE FOLLOWING RIGHT KNEE JOINT REPLACEMENT SURGERY: ICD-10-CM

## 2025-07-23 PROCEDURE — 73562 X-RAY EXAM OF KNEE 3: CPT

## 2025-07-23 PROCEDURE — 99214 OFFICE O/P EST MOD 30 MIN: CPT | Performed by: ORTHOPAEDIC SURGERY

## 2025-07-23 NOTE — PROGRESS NOTES
Name: Keesha Griffith      : 1965      MRN: [id number]  Encounter Provider: Kamlesh Richter DO  Encounter Date: 2025   Encounter department: [facility]  :  Assessment & Plan  S/P total knee replacement not using cement, right  Aftercare following right knee joint replacement surgery      Orders:    XR knee 3 vw right non injury; Future         Keesha Griffith is a pleasant 60 y.o. female doing very well 3 months status post right robotically assisted press-fit total knee arthroplasty.  I did discuss that the intermittent stiffness and soreness is normal at this point in her recovery.  I did note that it can take up to 1 year for the need to fully mature to appreciate full improvement.  However, I did emphasize she is doing very well at this point.  She may continue with activities of daily living as tolerated.  She may utilize anti-inflammatories as needed.  She will be required to have antibiotics prior to any dental work for her lifetime.  She is to allow 48 hours lead time prior to any dental work to be prescribed the medication.  She will follow-up in 9 months for repeat clinical evaluation and x-rays upon arrival.      No follow-ups on file.    I have personally reviewed pertinent imaging.  X-rays of the right knee demonstrates a stable well aligned press-fit total knee arthroplasty without evidence of lucency or implant failure.  No acute fractures observed.  No obvious lytic or blastic lesions.    History: Keesha Griffith is a 60 y.o. female presents for evaluation of her right knee.  She is 3 months status post right robotically assisted press-fit total knee arthroplasty.  Date of surgery 2025.  She states that she is doing quite well and is experiencing only minimal pain medially about the knee intermittently.  She describes it as a mild ache.  Otherwise she is able to ambulate without an assistive device.  She denies any swelling or redness concerning for  "infection.  She also denies any fevers or chills.  She is able to partake in activities of daily living with little to no difficulty.  She is working full-time.  She states that the only issue she has is kneeling or sitting crosslegged.  Today she denies any distal paresthesias.  Overall, she is quite happy with the progress up to this point.      Estimated body mass index is 33.39 kg/m² as calculated from the following:    Height as of this encounter: 5' 2.5\" (1.588 m).    Weight as of this encounter: 84.1 kg (185 lb 8 oz).    Lab Results   Component Value Date    HGBA1C 5.1 04/07/2025       Social History     Occupational History    Not on file   Tobacco Use    Smoking status: Never     Passive exposure: Past    Smokeless tobacco: Never   Vaping Use    Vaping status: Never Used   Substance and Sexual Activity    Alcohol use: Yes     Comment: occ    Drug use: Never    Sexual activity: Not on file       Objective:  Right Knee Exam     Tenderness   The patient is experiencing tenderness in the medial joint line.    Range of Motion   Extension:  0   Flexion:  120     Tests   Varus: negative Valgus: negative  Drawer:  Anterior - negative        Other   Erythema: absent  Scars: present (Well-healed vertical midline incision)  Sensation: normal  Pulse: present  Swelling: mild  Effusion: no effusion present    Comments:  Valgus to varus stress stable at 0, 30 and 90          Observations     Right Knee   Negative for effusion.       There were no vitals filed for this visit.    Subjective:  Past Medical History[1]    Past Surgical History[2]    Family History[3]    Current Medications[4]    Allergies[5]    Review of Systems   Constitutional:  Positive for activity change. Negative for chills, fever and unexpected weight change.   HENT:  Negative for hearing loss, nosebleeds and sore throat.    Eyes:  Negative for pain, redness and visual disturbance.   Respiratory:  Negative for cough, shortness of breath and wheezing.  "   Cardiovascular:  Negative for chest pain, palpitations and leg swelling.   Gastrointestinal:  Negative for abdominal pain, nausea and vomiting.   Endocrine: Negative for polydipsia and polyuria.   Genitourinary:  Negative for dysuria and hematuria.   Musculoskeletal:  See HPI  Skin:  Negative for rash and wound.   Neurological:  Negative for dizziness, numbness and headaches.   Psychiatric/Behavioral:  Negative for decreased concentration and suicidal ideas. The patient is not nervous/anxious.      Physical Exam  Vitals and nursing note reviewed.   Constitutional:       Appearance: Normal appearance. She is well-developed.   HENT:      Head: Normocephalic and atraumatic.      Right Ear: External ear normal.      Left Ear: External ear normal.   Eyes:      General: No scleral icterus.     Extraocular Movements: Extraocular movements intact.      Conjunctiva/sclera: Conjunctivae normal.   Cardiovascular:      Rate and Rhythm: Normal rate.   Pulmonary:      Effort: Pulmonary effort is normal. No respiratory distress.   Musculoskeletal:      Cervical back: Normal range of motion and neck supple.      Comments: See Ortho exam   Skin:     General: Skin is warm and dry.   Neurological:      General: No focal deficit present.      Mental Status: She is alert and oriented to person, place, and time.   Psychiatric:         Behavior: Behavior normal.     Scribe Attestation      I,:  Parviz Hammond am acting as a scribe while in the presence of the attending physician.:       I,:  Kamlesh Richter, DO personally performed the services described in this documentation    as scribed in my presence.:           This document was created using speech voice recognition software.   Grammatical errors, random word insertions, pronoun errors, and incomplete sentences are an occasional consequence of this system due to software limitations, ambient noise, and hardware issues.   Any formal questions or concerns about content, text, or  information contained within the body of this dictation should be directly addressed to the provider for clarification.          [1]   Past Medical History:  Diagnosis Date    Disease of thyroid gland     GERD (gastroesophageal reflux disease)     Hyperlipidemia    [2]   Past Surgical History:  Procedure Laterality Date    BREAST SURGERY Left     marker    CHOLECYSTECTOMY      lap    VT ARTHRP KNE CONDYLE&PLATU MEDIAL&LAT COMPARTMENTS Right 4/21/2025    Procedure: ARTHROPLASTY KNEE TOTAL W ROBOT - RIGHT - PRESS FIT - SAME DAY;  Surgeon: Kamlesh Richter DO;  Location: [facility];  Service: Orthopedics    WISDOM TOOTH EXTRACTION     [3]   Family History  Problem Relation Name Age of Onset    Cancer Father      Hypertension Father     [4]   Current Outpatient Medications:     acetaminophen (TYLENOL) 650 mg CR tablet, Take 1 tablet (650 mg total) by mouth every 8 (eight) hours as needed for mild pain, Disp: 30 tablet, Rfl: 0    Biotin 2.5 MG TABS, Take by mouth, Disp: , Rfl:     levothyroxine 75 mcg tablet, Take 75 mcg by mouth in the morning. Unsure of dose ., Disp: , Rfl:     mupirocin (BACTROBAN) 2 % ointment, Apply topically 2 (two) times a day, Disp: 15 g, Rfl: 0    ondansetron (ZOFRAN) 4 mg tablet, Take 1 tablet (4 mg total) by mouth every 8 (eight) hours as needed for nausea or vomiting, Disp: 20 tablet, Rfl: 0    ondansetron (ZOFRAN-ODT) 4 mg disintegrating tablet, Take 1 tablet (4 mg total) by mouth every 6 (six) hours as needed for nausea or vomiting, Disp: 20 tablet, Rfl: 0    rosuvastatin (CRESTOR) 10 MG tablet, Take 1 tablet (10 mg total) by mouth daily Will start after surgery, Disp: 90 tablet, Rfl: 1    ascorbic acid (VITAMIN C) 500 MG tablet, Take 1 tablet (500 mg total) by mouth 2 (two) times a day, Disp: 60 tablet, Rfl: 0    aspirin 325 mg tablet, Take 1 tablet (325 mg total) by mouth 2 (two) times a day, Disp: 84 tablet, Rfl: 0    celecoxib (CeleBREX) 100 mg capsule, Take 1 capsule (100 mg total) by  mouth 2 (two) times a day for 14 days, Disp: 28 capsule, Rfl: 0    Cholecalciferol (VITAMIN D3) 1,000 units tablet, Take 1 tablet (1,000 Units total) by mouth daily, Disp: 30 tablet, Rfl: 0    docusate sodium (COLACE) 100 mg capsule, Take 1 capsule (100 mg total) by mouth 2 (two) times a day, Disp: 60 capsule, Rfl: 0    ferrous sulfate 324 (65 Fe) mg, Take 1 tablet (324 mg total) by mouth daily before breakfast, Disp: 30 tablet, Rfl: 0    folic acid (FOLVITE) 1 mg tablet, Take 1 tablet (1 mg total) by mouth daily, Disp: 30 tablet, Rfl: 0    oxyCODONE (Roxicodone) 5 immediate release tablet, Take 1 tablet (5 mg total) by mouth every 4 (four) hours as needed for moderate pain Max Daily Amount: 30 mg (Patient not taking: Reported on 5/7/2025), Disp: 40 tablet, Rfl: 0    zinc sulfate (ZINCATE) 220 mg capsule, Take 1 capsule (220 mg total) by mouth daily, Disp: 30 capsule, Rfl: 0  [5] No Known Allergies

## (undated) DEVICE — HANDPIECE SET WITH HIGH FLOW TIP AND SUCTION TUBE: Brand: INTERPULSE

## (undated) DEVICE — HOOD: Brand: FLYTE, SURGICOOL

## (undated) DEVICE — VEST SURGEON DISPOSABLE

## (undated) DEVICE — SKIN MARKER DUAL TIP WITH RULER CAP, FLEXIBLE RULER AND LABELS: Brand: DEVON

## (undated) DEVICE — REPEL CUT REST SURGICAL GLV LNRS LG: Brand: REPEL

## (undated) DEVICE — CUFF TOURNIQUET 34 X 4 IN QUICK CONNECT DISP 1BLA

## (undated) DEVICE — VELYS SATEL DRAPE

## (undated) DEVICE — BANDAGE, ESMARK LF STR 6"X9' (20/CS): Brand: CYPRESS

## (undated) DEVICE — DISPOSABLE EQUIPMENT COVER: Brand: LARGE TOWEL DRAPE

## (undated) DEVICE — 3 BONE CEMENT MIXER: Brand: MIXEVAC

## (undated) DEVICE — VELYS ROBOT-ASSISTED SOLUTION ARRAY DRILL PIN 125 MM X 4 MM: Brand: VELYS

## (undated) DEVICE — GLOVE SRG BIOGEL 7.5

## (undated) DEVICE — DRAPE SHEET X-LG

## (undated) DEVICE — 3M™ STERI-DRAPE™ U-DRAPE 1015: Brand: STERI-DRAPE™

## (undated) DEVICE — VELYS ROBOT DRAPE

## (undated) DEVICE — SUT STRATAFIX SPIRAL 3-0 PGA/PCL 30 X 30 CM SXMD2B410

## (undated) DEVICE — INSTRUMENT POUCH: Brand: CONVERTORS

## (undated) DEVICE — GLOVE INDICATOR PI UNDERGLOVE SZ 8.5 BLUE

## (undated) DEVICE — SUT STRATFIX SPIRAL PDS PLUS 1 CT-1/CT-1 12IN SXPP2B403

## (undated) DEVICE — SUT VICRYL 0 CP-1 27 IN J267H

## (undated) DEVICE — DUAL CUT SAGITTAL BLADE

## (undated) DEVICE — COBAN 4 IN STERILE

## (undated) DEVICE — ASTOUND SURGICAL GOWN, XXX LARGE, X-LONG: Brand: CONVERTORS

## (undated) DEVICE — VELYS ROBOT-ASSISTED SOLUTION ARRAY DRILL PIN 100 MM X 4 MM: Brand: VELYS

## (undated) DEVICE — CAPIT KNEE ATTUNE FB MEDIAL STAB AOX POR

## (undated) DEVICE — GLOVE SRG BIOGEL ORTHOPEDIC 8.5

## (undated) DEVICE — CHLORAPREP HI-LITE 26ML ORANGE

## (undated) DEVICE — STERILE DOUBLE BASIN SET PACK: Brand: CARDINAL HEALTH

## (undated) DEVICE — VELYS ROBOTIC-ASSISTED SOLUTION ARRAY SET - KNEE: Brand: VELYS

## (undated) DEVICE — GLOVE INDICATOR PI UNDERGLOVE SZ 7.5 BLUE

## (undated) DEVICE — 3M™ IOBAN™ 2 ANTIMICROBIAL INCISE DRAPE 6651EZ: Brand: IOBAN™ 2

## (undated) DEVICE — ADHESIVE SKIN HIGH VISCOSITY EXOFIN 1ML

## (undated) DEVICE — GLOVE INDICATOR PI UNDERGLOVE SZ 8 BLUE

## (undated) DEVICE — BAG WOUND LAVAGE 1L BIASURGE ADV

## (undated) DEVICE — VELYS BLADE SAW OSC 85 X 19 X 2MM

## (undated) DEVICE — DRESSING MEPILEX AG BORDER 4 X 12 IN

## (undated) DEVICE — INTENDED FOR TISSUE SEPARATION, AND OTHER PROCEDURES THAT REQUIRE A SHARP SURGICAL BLADE TO PUNCTURE OR CUT.: Brand: BARD-PARKER ® CARBON RIB-BACK BLADES

## (undated) DEVICE — GLOVE SRG BIOGEL 8

## (undated) DEVICE — TIBURON EXTREMITY SHEET: Brand: CONVERTORS

## (undated) DEVICE — STR UTILITY DRAPE PK, 4 PK: Brand: CARDINAL HEALTH

## (undated) DEVICE — Device

## (undated) DEVICE — NEPTUNE E-SEP SMOKE EVACUATION PENCIL, COATED, 70MM BLADE, PUSH BUTTON SWITCH: Brand: NEPTUNE E-SEP

## (undated) DEVICE — ORTHOPEDIC PACK: Brand: CARDINAL HEALTH

## (undated) DEVICE — ANTIBACTERIAL UNDYED BRAIDED (POLYGLACTIN 910), SYNTHETIC ABSORBABLE SUTURE: Brand: COATED VICRYL